# Patient Record
Sex: FEMALE | Race: WHITE | NOT HISPANIC OR LATINO | Employment: OTHER | URBAN - METROPOLITAN AREA
[De-identification: names, ages, dates, MRNs, and addresses within clinical notes are randomized per-mention and may not be internally consistent; named-entity substitution may affect disease eponyms.]

---

## 2017-03-10 ENCOUNTER — OFFICE VISIT (OUTPATIENT)
Dept: MEDICAL GROUP | Facility: CLINIC | Age: 62
End: 2017-03-10
Payer: COMMERCIAL

## 2017-03-10 VITALS
BODY MASS INDEX: 34.83 KG/M2 | TEMPERATURE: 97.9 F | OXYGEN SATURATION: 97 % | WEIGHT: 204 LBS | HEART RATE: 96 BPM | DIASTOLIC BLOOD PRESSURE: 80 MMHG | SYSTOLIC BLOOD PRESSURE: 124 MMHG | HEIGHT: 64 IN

## 2017-03-10 DIAGNOSIS — Z12.31 ENCOUNTER FOR SCREENING MAMMOGRAM FOR MALIGNANT NEOPLASM OF BREAST: ICD-10-CM

## 2017-03-10 DIAGNOSIS — E11.21 CONTROLLED TYPE 2 DIABETES MELLITUS WITH DIABETIC NEPHROPATHY, WITHOUT LONG-TERM CURRENT USE OF INSULIN (HCC): ICD-10-CM

## 2017-03-10 DIAGNOSIS — I10 ESSENTIAL HYPERTENSION: ICD-10-CM

## 2017-03-10 DIAGNOSIS — R07.9 CHEST PAIN, UNSPECIFIED TYPE: ICD-10-CM

## 2017-03-10 DIAGNOSIS — E78.5 DYSLIPIDEMIA: ICD-10-CM

## 2017-03-10 LAB
HBA1C MFR BLD: NORMAL % (ref ?–5.8)
INT CON NEG: NEGATIVE
INT CON POS: POSITIVE

## 2017-03-10 PROCEDURE — 83036 HEMOGLOBIN GLYCOSYLATED A1C: CPT | Performed by: PHYSICIAN ASSISTANT

## 2017-03-10 PROCEDURE — 99214 OFFICE O/P EST MOD 30 MIN: CPT | Performed by: PHYSICIAN ASSISTANT

## 2017-03-10 NOTE — ASSESSMENT & PLAN NOTE
Currently on metformin -glyburide. Last A1c 7.7. Last eye exam within last year when she was having cataract surgery. Last micral albumin and creatinine ratio within normal limits. States for the past 3 months she has not been complient with diet.

## 2017-03-10 NOTE — ASSESSMENT & PLAN NOTE
Pt has known HTN, controlled with current medicines, lisinopril-HCTZ. She denies HA, blurred vision, dizziness, shortness of breath, palpitations, or chest pain, lower extremity edema.

## 2017-03-10 NOTE — ASSESSMENT & PLAN NOTE
10 days ago had mid chest pain at rest while in [place] visiting family. Chest pain was in mid chest, pain was more like pressure, w nausea, lasted 2 mins. Resolved on its own, no episodes since then. Diabetic pt currently on metfomin and lipitor, doean not take ASP.   About 4 yrs ago had carotid US on neck w 40% stenosis on the R side.

## 2017-03-10 NOTE — MR AVS SNAPSHOT
"        Garry Coronel   3/10/2017 3:05 PM   Office Visit   MRN: [id number]    Department:  [facility]   Dept Phone:  [phone]    Description:  Female : 1955   Provider:  Damaris Soares PA-C           Reason for Visit     Chest Pain pressure in chest only felt it once x95zham ago      Allergies as of 3/10/2017     Allergen Noted Reactions    Maxzide [Hydrochlorothiazide W-Triamterene] 2013         You were diagnosed with     Essential hypertension   [3931224]       Dyslipidemia   [197314]       Chest pain, unspecified type   [9469236]       Controlled type 2 diabetes mellitus with diabetic nephropathy, without long-term current use of insulin (CMS-HCC)   [9802135]       BMI 35.0-35.9,adult   [170519]       Encounter for screening mammogram for malignant neoplasm of breast   [302462]         Vital Signs     Blood Pressure Pulse Temperature Height Weight Body Mass Index    124/80 mmHg 96 36.6 °C (97.9 °F) 1.626 m (5' 4\") 92.534 kg (204 lb) 35.00 kg/m2    Oxygen Saturation Smoking Status                97% Former Smoker          Basic Information     Date Of Birth Sex Race Ethnicity Preferred Language    1955 Female White Non- English      Problem List              ICD-10-CM Priority Class Noted - Resolved    HTN (hypertension) I10   2013 - Present    Sleep apnea G47.30   2013 - Present    B12 deficiency E53.8   2013 - Present    Dyslipidemia E78.5   2013 - Present    Vitamin D deficiency E55.9   2013 - Present    Obesity (BMI 30-39.9) E66.9   10/9/2014 - Present    CKD stage 3 due to type 2 diabetes mellitus (CMS-HCC) E11.22, N18.3   2015 - Present    DM (diabetes mellitus) type II controlled with renal manifestation (CMS-HCC) E11.29   2015 - Present    Chest pain R07.9   3/10/2017 - Present      Health Maintenance        Date Due Completion Dates    IMM ZOSTER VACCINE 2014 ---    MAMMOGRAM 2016    RETINAL SCREENING " 5/6/2016 5/6/2015, 5/6/2015 (N/S)    Override on 5/6/2015: (N/S)    IMM INFLUENZA (1) 9/1/2016 11/9/2015, 10/9/2014, 12/27/2013    A1C SCREENING 3/12/2017 9/12/2016, 10/6/2015, 7/2/2015, 9/16/2014, 3/14/2014, 10/23/2013    FASTING LIPID PROFILE 9/12/2017 9/12/2016, 10/6/2015, 7/2/2015, 9/16/2014, 3/14/2014, 10/23/2013, 1/24/2013    SERUM CREATININE 9/12/2017 9/12/2016, 10/6/2015, 7/2/2015, 9/16/2014, 3/14/2014, 10/23/2013, 1/24/2013    URINE ACR / MICROALBUMIN 9/19/2017 9/19/2016, 7/2/2015, 3/14/2014, 1/24/2013    DIABETES MONOFILAMENT / LE EXAM 9/19/2017 9/19/2016 (N/S), 11/9/2015 (N/S), 3/13/2014 (N/S)    Override on 9/19/2016: (N/S)    Override on 11/9/2015: (N/S)    Override on 3/13/2014: (N/S)    PAP SMEAR 7/9/2018 7/9/2015 (Done), 7/9/2015    Override on 7/9/2015: Done    COLONOSCOPY 10/9/2024 10/9/2014 (Declined)    Override on 10/9/2014: Patient Declined    IMM DTaP/Tdap/Td Vaccine (2 - Td) 7/9/2025 7/9/2015            Results     POCT  A1C      Component    Glycohemoglobin    9.6%    Internal Control Negative    Negative    Internal Control Positive    Positive                        Current Immunizations     13-VALENT PCV PREVNAR 11/9/2015  3:20 PM    Influenza TIV (IM) 12/27/2013 11:15 AM    Influenza Vaccine Quad Inj (Pf) 11/9/2015  3:20 PM, 10/9/2014 10:30 AM    Pneumococcal polysaccharide vaccine (PPSV-23) 10/9/2014 10:35 AM    Tdap Vaccine 7/9/2015  3:20 PM      Below and/or attached are the medications your provider expects you to take. Review all of your home medications and newly ordered medications with your provider and/or pharmacist. Follow medication instructions as directed by your provider and/or pharmacist. Please keep your medication list with you and share with your provider. Update the information when medications are discontinued, doses are changed, or new medications (including over-the-counter products) are added; and carry medication information at all times in the event of emergency  situations     Allergies:  MAXZIDE - (reactions not documented)               Medications  Valid as of: March 10, 2017 -  4:17 PM    Generic Name Brand Name Tablet Size Instructions for use    Atorvastatin Calcium (Tab) LIPITOR 10 MG TAKE ONE TABLET BY MOUTH ONCE DAILY        Blood Glucose Monitoring Suppl (Kit) ACCU-CHEK ADVANTAGE DIABETES  1 Each by Does not apply route every day.        Cholecalciferol (Tab) cholecalciferol 1000 UNIT Take 2,000 Units by mouth every day.        Cyanocobalamin (Tab) VITAMIN B-12 500 MCG Take 500 mcg by mouth every 48 hours.        GlyBURIDE-MetFORMIN (Tab) GLUCOVANCE 5-500 MG Take 1 Tab by mouth 2 times a day, with meals.        Guaifenesin-Codeine (Syrup) TUSSI-ORGANIDIN -10 MG/5ML Take 5 mL by mouth 3 times a day as needed for Cough (or use qhs).        Lisinopril-Hydrochlorothiazide (Tab) PRINZIDE, ZESTORETIC 20-12.5 MG TAKE ONE TABLET BY MOUTH ONCE DAILY        Multiple Vitamins-Minerals (Tab) WOMENS 50+ MULTI VITAMIN/MIN  Take  by mouth.        Promethazine-Codeine (Syrup) PHENERGAN-CODEINE 6.25-10 MG/5ML Take 5-10 mL by mouth 4 times a day as needed for Cough.        Promethazine-Codeine (Syrup) PHENERGAN-CODEINE 6.25-10 MG/5ML Take 5-10 mL by mouth 4 times a day as needed for Cough.        .                 Medicines prescribed today were sent to:     [address]), [facility] [id number] () [place] [address]    Phone: [phone] Fax: [phone]    Open 24 Hours?: No      Medication refill instructions:       If your prescription bottle indicates you have medication refills left, it is not necessary to call your provider’s office. Please contact your pharmacy and they will refill your medication.    If your prescription bottle indicates you do not have any refills left, you may request refills at any time through one of the following ways: The online [employer] system (except Urgent Care), by calling your provider’s  office, or by asking your pharmacy to contact your provider’s office with a refill request. Medication refills are processed only during regular business hours and may not be available until the next business day. Your provider may request additional information or to have a follow-up visit with you prior to refilling your medication.   *Please Note: Medication refills are assigned a new Rx number when refilled electronically. Your pharmacy may indicate that no refills were authorized even though a new prescription for the same medication is available at the pharmacy. Please request the medicine by name with the pharmacy before contacting your provider for a refill.           [employer] Access Code: Activation code not generated  Current [employer] Status: Active

## 2017-03-10 NOTE — PROGRESS NOTES
Subjective:   CC: Garry Coronel is a 61 y.o. female here today for establishing care. Patient was under care of Dr. Alvarado.    HTN (hypertension)  Pt has known HTN, controlled with current medicines, lisinopril-HCTZ. She denies HA, blurred vision, dizziness, shortness of breath, palpitations, or chest pain, lower extremity edema.    Dyslipidemia  Continues on atorvastatin 10 mg w/o any SE or mylagia.      Chest pain  This is a new problem. 10 days ago had mid chest pain at rest while in [place] visiting family. Chest pain was in mid chest radiating to the back, not radiating to left arm or Jaw. pain was more like pressure, w nausea, lasted 2 mins. Resolved on its own, no episodes since then. About 4 yrs ago had carotid US on neck w 40% stenosis on the R side.   Currently denies any chest pain, shortness of breath, chest tightness or lower leg edema      DM (diabetes mellitus) type II controlled with renal manifestation  Currently on metformin -glyburide. Last A1c 7.7. Last eye exam within last year when she was having cataract surgery. Last micral albumin and creatinine ratio within normal limits. States for the past 3 months she has not been complient with diet.          Current medicines (including changes today)  Current Outpatient Prescriptions   Medication Sig Dispense Refill   • lisinopril-hydrochlorothiazide (PRINZIDE, ZESTORETIC) 20-12.5 MG per tablet TAKE ONE TABLET BY MOUTH ONCE DAILY 90 Tab 1   • glyburide-metformin (GLUCOVANCE) 5-500 MG Tab Take 1 Tab by mouth 2 times a day, with meals. 180 Tab 1   • atorvastatin (LIPITOR) 10 MG Tab TAKE ONE TABLET BY MOUTH ONCE DAILY 90 Tab 1   • vitamin D (CHOLECALCIFEROL) 1000 UNIT TABS Take 2,000 Units by mouth every day.     • Multiple Vitamins-Minerals (WOMENS 50+ MULTI VITAMIN/MIN) TABS Take  by mouth.     • Blood Glucose Monitoring Suppl (ACCU-CHEK ADVANTAGE DIABETES) KIT 1 Each by Does not apply route every day. 1 Each 0   • promethazine-codeine  "(PHENERGAN-CODEINE) 6.25-10 MG/5ML SYRP Take 5-10 mL by mouth 4 times a day as needed for Cough. 150 mL 0   • guaifenesin-codeine (TUSSI-ORGANIDIN NR) 100-10 MG/5ML syrup Take 5 mL by mouth 3 times a day as needed for Cough (or use qhs). 150 mL 0   • promethazine-codeine (PHENERGAN-CODEINE) 6.25-10 MG/5ML SYRP Take 5-10 mL by mouth 4 times a day as needed for Cough. 150 mL 0   • cyanocobalamin (VITAMIN B-12) 500 MCG TABS Take 500 mcg by mouth every 48 hours.       No current facility-administered medications for this visit.         Past medical, surgical, family, and social history are reviewed in Epic chart by me today.   Medications and allergies reviewed in Epic chart by me today.         ROS   As documented in history of present illness above     Objective:     Blood pressure 124/80, pulse 96, temperature 36.6 °C (97.9 °F), height 1.626 m (5' 4\"), weight 92.534 kg (204 lb), SpO2 97 %. Body mass index is 35 kg/(m^2).   Physical Exam:  Constitutional: Alert, oriented in no acute distress.  Psych: Eye contact is good, speech goal directed, affect calm  Eyes: Conjunctiva non-injected, sclera non-icteric.  Lungs: Unlabored respiratory effort, clear to auscultation bilaterally with good excursion, no wheez or rhonci  CV: regular rate and rhythm. No lower extremity edema          Assessment and Plan:   The following treatment plan was discussed    1. Essential hypertension  Controlled continue meds    2. Dyslipidemia  Controlled continue meds    3. Chest pain, unspecified type  EKG was not done today because patient is currently asymptomatic  - CARDIAC STRESS TEST TREADMILL ONLY    4. Controlled type 2 diabetes mellitus with diabetic nephropathy, without long-term current use of insulin (CMS-[facility])  Discussed adding a new medicine. Patient admits to not being compliant  Going to be evaluated in 3 months. If A1c is not improving adding another medicine  - POCT  A1C --> 9.6    5. BMI 35.0-35.9,adult    - Patient identified " as having weight management issue.  Appropriate orders and counseling given.    6. Encounter for screening mammogram for malignant neoplasm of breast    - MA-SCREEN MAMMO W/CAD-BILAT    We discussed red flags incuding worsening pain, pressure, shortness of breath or overall decline in health. Patient verbalize understanding and will either call our office or present to the emergency room.     Followup: Return if symptoms worsen or fail to improve.         Please note that this dictation was created using voice recognition software. I have made every reasonable attempt to correct obvious errors, but I expect that there are errors of grammar and possibly content that I did not discover before finalizing the note.

## 2017-03-27 DIAGNOSIS — E78.5 DYSLIPIDEMIA: ICD-10-CM

## 2017-03-28 NOTE — TELEPHONE ENCOUNTER
Was the patient seen in the last year in this department? Yes     Does patient have an active prescription for medications requested? No     Received Request Via: Pharmacy   RX : Atorvastatin

## 2017-03-29 ENCOUNTER — NON-PROVIDER VISIT (OUTPATIENT)
Dept: CARDIOLOGY | Facility: MEDICAL CENTER | Age: 62
End: 2017-03-29
Attending: PHYSICIAN ASSISTANT
Payer: COMMERCIAL

## 2017-03-29 DIAGNOSIS — R07.9 CHEST PAIN, UNSPECIFIED TYPE: ICD-10-CM

## 2017-03-29 LAB — TREADMILL STRESS: NORMAL

## 2017-03-29 PROCEDURE — 93015 CV STRESS TEST SUPVJ I&R: CPT | Performed by: INTERNAL MEDICINE

## 2017-04-19 DIAGNOSIS — I10 ESSENTIAL HYPERTENSION: ICD-10-CM

## 2017-04-20 RX ORDER — LISINOPRIL AND HYDROCHLOROTHIAZIDE 20; 12.5 MG/1; MG/1
TABLET ORAL
Qty: 90 TAB | Refills: 0 | Status: SHIPPED | OUTPATIENT
Start: 2017-04-20 | End: 2017-08-16 | Stop reason: SDUPTHER

## 2017-04-20 NOTE — TELEPHONE ENCOUNTER
I don't see any new lab results for this patient. Please f/u on any possible lab results from media.  Refilled BP med for 3 mons. If no new lab results --> no more refills until new labs are done.    Damaris Soares PA-C

## 2017-05-31 RX ORDER — GLYBURIDE-METFORMIN HYDROCHLORIDE 5; 500 MG/1; MG/1
1 TABLET ORAL 2 TIMES DAILY WITH MEALS
Qty: 180 TAB | Refills: 0 | Status: SHIPPED | OUTPATIENT
Start: 2017-05-31 | End: 2017-08-17 | Stop reason: SDUPTHER

## 2017-08-16 RX ORDER — LISINOPRIL AND HYDROCHLOROTHIAZIDE 20; 12.5 MG/1; MG/1
TABLET ORAL
Qty: 90 TAB | Refills: 0 | Status: SHIPPED | OUTPATIENT
Start: 2017-08-16 | End: 2017-11-04 | Stop reason: SDUPTHER

## 2017-08-17 RX ORDER — ATORVASTATIN CALCIUM 10 MG/1
TABLET, FILM COATED ORAL
Qty: 90 TAB | Refills: 2 | Status: SHIPPED | OUTPATIENT
Start: 2017-08-17 | End: 2018-04-27 | Stop reason: SDUPTHER

## 2017-08-17 RX ORDER — GLYBURIDE-METFORMIN HYDROCHLORIDE 5; 500 MG/1; MG/1
1 TABLET ORAL 2 TIMES DAILY WITH MEALS
Qty: 180 TAB | Refills: 0 | Status: SHIPPED | OUTPATIENT
Start: 2017-08-17 | End: 2018-04-27 | Stop reason: SDUPTHER

## 2017-08-17 NOTE — TELEPHONE ENCOUNTER
Was the patient seen in the last year in this department? Yes    Does patient have an active prescription for medications requested? No   Received Request Via: Patient

## 2017-08-30 ENCOUNTER — TELEPHONE (OUTPATIENT)
Dept: MEDICAL GROUP | Facility: MEDICAL CENTER | Age: 62
End: 2017-08-30

## 2017-08-30 NOTE — TELEPHONE ENCOUNTER
Pt is due for follow up o uncontrolled diabetes. Needs appointment for further med refill.,  Also letter from  [facility] states she has been noncompliant w medicine.   Please inform patient.      Damaris Soares P.A.-C.

## 2017-11-04 DIAGNOSIS — I10 ESSENTIAL HYPERTENSION: ICD-10-CM

## 2017-11-08 RX ORDER — LISINOPRIL AND HYDROCHLOROTHIAZIDE 20; 12.5 MG/1; MG/1
TABLET ORAL
Qty: 90 TAB | Refills: 3 | Status: SHIPPED | OUTPATIENT
Start: 2017-11-08 | End: 2018-04-27 | Stop reason: SDUPTHER

## 2017-11-15 ENCOUNTER — OFFICE VISIT (OUTPATIENT)
Dept: MEDICAL GROUP | Facility: MEDICAL CENTER | Age: 62
End: 2017-11-15
Payer: COMMERCIAL

## 2017-11-15 ENCOUNTER — HOSPITAL ENCOUNTER (OUTPATIENT)
Dept: RADIOLOGY | Facility: MEDICAL CENTER | Age: 62
End: 2017-11-15
Attending: PHYSICIAN ASSISTANT
Payer: COMMERCIAL

## 2017-11-15 VITALS
BODY MASS INDEX: 34.52 KG/M2 | HEART RATE: 79 BPM | OXYGEN SATURATION: 97 % | DIASTOLIC BLOOD PRESSURE: 90 MMHG | SYSTOLIC BLOOD PRESSURE: 144 MMHG | TEMPERATURE: 98.6 F | WEIGHT: 202.2 LBS | HEIGHT: 64 IN | RESPIRATION RATE: 16 BRPM

## 2017-11-15 DIAGNOSIS — S49.92XA INJURY OF LEFT SHOULDER, INITIAL ENCOUNTER: ICD-10-CM

## 2017-11-15 PROCEDURE — 99213 OFFICE O/P EST LOW 20 MIN: CPT | Performed by: PHYSICIAN ASSISTANT

## 2017-11-15 PROCEDURE — 73030 X-RAY EXAM OF SHOULDER: CPT | Mod: LT

## 2017-11-15 NOTE — PROGRESS NOTES
Subjective:      Garry Coronel is a 61 y.o. female who presents with Shoulder Pain (yesterday fell while pushing a shopping cart, heard pops in shoulder, took OTC meds and iced it) and Other (some associated back and elbow pain)          Chief Complaint   Patient presents with   • Shoulder Pain     yesterday fell while pushing a shopping cart, heard pops in shoulder, took OTC meds and iced it   • Other     some associated back and elbow pain       HPI  Patient presents with c/o left shoulder pain. Yesterday tripped while shopping, fell forward holding onto the shopping cart stretched out arm and felt a pop then fell on arm. No deformity or swelling. No bruising. No previous injuries. Tried ice and ibuprofen. Still hurting. Hurts to lift. Going out of town on the weekend. No other injury. No rash or skin lesion.     ROSno fever/chills. No headache/dizziness. No neck or back pain. No chest pain, SOB or difficulty breathing. No n/v/d/c or abdominal pain. No urinary symptoms.    Active Ambulatory Problems     Diagnosis Date Noted   • HTN (hypertension) 01/24/2013   • Sleep apnea 01/24/2013   • B12 deficiency 02/07/2013   • Dyslipidemia 02/07/2013   • Vitamin D deficiency 12/27/2013   • Obesity (BMI 30-39.9) 10/09/2014   • CKD stage 3 due to type 2 diabetes mellitus (CMS-HCC) 11/09/2015   • DM (diabetes mellitus) type II controlled with renal manifestation (CMS-HCC) 11/09/2015   • Chest pain 03/10/2017     Resolved Ambulatory Problems     Diagnosis Date Noted   • Obesity, morbid (more than 100 lbs over ideal weight or BMI > 40) (CMS-HCC) 01/24/2013   • Morbid obesity (CMS-HCC) 02/07/2013   • DM II (diabetes mellitus, type II), controlled (CMS-HCC) 02/07/2013     Past Medical History:   Diagnosis Date   • Hypertension    • Sleep apnea      Current Outpatient Prescriptions   Medication Sig Dispense Refill   • lisinopril-hydrochlorothiazide (PRINZIDE, ZESTORETIC) 20-12.5 MG per tablet TAKE ONE TABLET BY MOUTH ONCE DAILY 90  "Tab 3   • atorvastatin (LIPITOR) 10 MG Tab TAKE ONE TABLET BY MOUTH ONCE DAILY 90 Tab 2   • glyburide-metformin (GLUCOVANCE) 5-500 MG Tab Take 1 Tab by mouth 2 times a day, with meals. 180 Tab 0   • vitamin D (CHOLECALCIFEROL) 1000 UNIT TABS Take 2,000 Units by mouth every day.     • cyanocobalamin (VITAMIN B-12) 500 MCG TABS Take 500 mcg by mouth every 48 hours.     • Multiple Vitamins-Minerals (WOMENS 50+ MULTI VITAMIN/MIN) TABS Take  by mouth.     • Blood Glucose Monitoring Suppl (ACCU-CHEK ADVANTAGE DIABETES) KIT 1 Each by Does not apply route every day. 1 Each 0     No current facility-administered medications for this visit.    allergy to maxide  Non-smoker   Objective:     /90   Pulse 79   Temp 37 °C (98.6 °F)   Resp 16   Ht 1.626 m (5' 4\")   Wt 91.7 kg (202 lb 3.2 oz)   SpO2 97%   BMI 34.71 kg/m²      Physical Exam   Constitutional: She is oriented to person, place, and time. She appears well-developed and well-nourished. No distress.   Musculoskeletal:        Left shoulder: She exhibits decreased range of motion, tenderness, bony tenderness, pain and decreased strength. She exhibits no swelling, no effusion and no spasm.   Neurological: She is alert and oriented to person, place, and time.   Skin: Skin is warm and dry. No rash noted. She is not diaphoretic. No pallor.   Psychiatric: She has a normal mood and affect. Her behavior is normal. Judgment and thought content normal.   Vitals reviewed.              Assessment/Plan:     1. Injury of left shoulder, initial encounter    - DX-SHOULDER 2+ LEFT; Future    Sling, ibuprofen, ice. Will call with xray results. When patient returns will consider MRI if pain and weakness continue  "

## 2017-11-16 ENCOUNTER — PATIENT MESSAGE (OUTPATIENT)
Dept: MEDICAL GROUP | Facility: MEDICAL CENTER | Age: 62
End: 2017-11-16

## 2017-11-16 ENCOUNTER — TELEPHONE (OUTPATIENT)
Dept: MEDICAL GROUP | Facility: MEDICAL CENTER | Age: 62
End: 2017-11-16

## 2017-11-16 NOTE — TELEPHONE ENCOUNTER
Written by Modesta Michael P.A.-C. on 11/16/2017  9:26 AM   Good Morning,   Shoulder xray does not show any fracture. There is some arthritis that is likely inflammed from the fall. Rest, ibuprofen, gentle stretching. Let me know when you get back in town if pain and/or weakness continues and we should consider MRI.   Thanks!   Modesta Michael PA-C   ----------------------------------------------------------------------------------------------------------------------------------------------------------------------------  Left pt a mess to call back.

## 2017-11-16 NOTE — TELEPHONE ENCOUNTER
----- Message from Modesta Michael P.A.-C. sent at 11/16/2017  9:26 AM PST -----  Please call patient and verify she receives results. Thanks! Modesta Michael PA-C

## 2018-04-27 DIAGNOSIS — E11.21 CONTROLLED TYPE 2 DIABETES MELLITUS WITH DIABETIC NEPHROPATHY, WITHOUT LONG-TERM CURRENT USE OF INSULIN (HCC): ICD-10-CM

## 2018-04-27 DIAGNOSIS — N18.30 CKD STAGE 3 DUE TO TYPE 2 DIABETES MELLITUS (HCC): ICD-10-CM

## 2018-04-27 DIAGNOSIS — I10 ESSENTIAL HYPERTENSION: ICD-10-CM

## 2018-04-27 DIAGNOSIS — E11.22 CKD STAGE 3 DUE TO TYPE 2 DIABETES MELLITUS (HCC): ICD-10-CM

## 2018-04-27 DIAGNOSIS — E78.5 DYSLIPIDEMIA: ICD-10-CM

## 2018-04-27 DIAGNOSIS — Z00.00 PREVENTATIVE HEALTH CARE: ICD-10-CM

## 2018-04-27 RX ORDER — GLYBURIDE-METFORMIN HYDROCHLORIDE 5; 500 MG/1; MG/1
1 TABLET ORAL 2 TIMES DAILY WITH MEALS
Qty: 180 TAB | Refills: 0 | Status: SHIPPED | OUTPATIENT
Start: 2018-04-27 | End: 2018-07-24

## 2018-04-27 RX ORDER — LISINOPRIL AND HYDROCHLOROTHIAZIDE 20; 12.5 MG/1; MG/1
TABLET ORAL
Qty: 90 TAB | Refills: 0 | Status: SHIPPED | OUTPATIENT
Start: 2018-04-27 | End: 2018-07-24

## 2018-04-27 RX ORDER — ATORVASTATIN CALCIUM 10 MG/1
TABLET, FILM COATED ORAL
Qty: 90 TAB | Refills: 0 | Status: SHIPPED | OUTPATIENT
Start: 2018-04-27 | End: 2018-09-10 | Stop reason: SDUPTHER

## 2018-04-27 NOTE — TELEPHONE ENCOUNTER
Pt is way over due for f/u appointment and blood work. I have it documented on 8/30/17 tel encounter that no more refills until blood work is done.   She has an appointment on 5/8/18.    Please inform patient that I have ordered blood work for her. Advise getting it done before coming in.    Damaris Soares P.A.-C.

## 2018-05-03 ENCOUNTER — HOSPITAL ENCOUNTER (OUTPATIENT)
Dept: LAB | Facility: MEDICAL CENTER | Age: 63
End: 2018-05-03
Attending: PHYSICIAN ASSISTANT
Payer: COMMERCIAL

## 2018-05-03 DIAGNOSIS — E11.22 CKD STAGE 3 DUE TO TYPE 2 DIABETES MELLITUS (HCC): ICD-10-CM

## 2018-05-03 DIAGNOSIS — I10 ESSENTIAL HYPERTENSION: ICD-10-CM

## 2018-05-03 DIAGNOSIS — Z00.00 PREVENTATIVE HEALTH CARE: ICD-10-CM

## 2018-05-03 DIAGNOSIS — N18.30 CKD STAGE 3 DUE TO TYPE 2 DIABETES MELLITUS (HCC): ICD-10-CM

## 2018-05-03 DIAGNOSIS — E78.5 DYSLIPIDEMIA: ICD-10-CM

## 2018-05-03 DIAGNOSIS — E11.21 CONTROLLED TYPE 2 DIABETES MELLITUS WITH DIABETIC NEPHROPATHY, WITHOUT LONG-TERM CURRENT USE OF INSULIN (HCC): ICD-10-CM

## 2018-05-03 LAB
25(OH)D3 SERPL-MCNC: 44 NG/ML (ref 30–100)
ALBUMIN SERPL BCP-MCNC: 3.8 G/DL (ref 3.2–4.9)
ALBUMIN/GLOB SERPL: 1.2 G/DL
ALP SERPL-CCNC: 77 U/L (ref 30–99)
ALT SERPL-CCNC: 15 U/L (ref 2–50)
ANION GAP SERPL CALC-SCNC: 8 MMOL/L (ref 0–11.9)
AST SERPL-CCNC: 16 U/L (ref 12–45)
BASOPHILS # BLD AUTO: 0.8 % (ref 0–1.8)
BASOPHILS # BLD: 0.08 K/UL (ref 0–0.12)
BILIRUB SERPL-MCNC: 0.7 MG/DL (ref 0.1–1.5)
BUN SERPL-MCNC: 36 MG/DL (ref 8–22)
CALCIUM SERPL-MCNC: 9.4 MG/DL (ref 8.5–10.5)
CHLORIDE SERPL-SCNC: 100 MMOL/L (ref 96–112)
CHOLEST SERPL-MCNC: 144 MG/DL (ref 100–199)
CO2 SERPL-SCNC: 27 MMOL/L (ref 20–33)
CREAT SERPL-MCNC: 1.35 MG/DL (ref 0.5–1.4)
CREAT UR-MCNC: 215 MG/DL
EOSINOPHIL # BLD AUTO: 0.32 K/UL (ref 0–0.51)
EOSINOPHIL NFR BLD: 3.3 % (ref 0–6.9)
ERYTHROCYTE [DISTWIDTH] IN BLOOD BY AUTOMATED COUNT: 44 FL (ref 35.9–50)
GLOBULIN SER CALC-MCNC: 3.3 G/DL (ref 1.9–3.5)
GLUCOSE SERPL-MCNC: 307 MG/DL (ref 65–99)
HCT VFR BLD AUTO: 42.9 % (ref 37–47)
HDLC SERPL-MCNC: 37 MG/DL
HGB BLD-MCNC: 13.7 G/DL (ref 12–16)
IMM GRANULOCYTES # BLD AUTO: 0.04 K/UL (ref 0–0.11)
IMM GRANULOCYTES NFR BLD AUTO: 0.4 % (ref 0–0.9)
LDLC SERPL CALC-MCNC: 70 MG/DL
LYMPHOCYTES # BLD AUTO: 2.3 K/UL (ref 1–4.8)
LYMPHOCYTES NFR BLD: 24 % (ref 22–41)
MCH RBC QN AUTO: 28.5 PG (ref 27–33)
MCHC RBC AUTO-ENTMCNC: 31.9 G/DL (ref 33.6–35)
MCV RBC AUTO: 89.2 FL (ref 81.4–97.8)
MICROALBUMIN UR-MCNC: 1 MG/DL
MICROALBUMIN/CREAT UR: 5 MG/G (ref 0–30)
MONOCYTES # BLD AUTO: 0.49 K/UL (ref 0–0.85)
MONOCYTES NFR BLD AUTO: 5.1 % (ref 0–13.4)
NEUTROPHILS # BLD AUTO: 6.37 K/UL (ref 2–7.15)
NEUTROPHILS NFR BLD: 66.4 % (ref 44–72)
NRBC # BLD AUTO: 0 K/UL
NRBC BLD-RTO: 0 /100 WBC
PLATELET # BLD AUTO: 223 K/UL (ref 164–446)
PMV BLD AUTO: 11.4 FL (ref 9–12.9)
POTASSIUM SERPL-SCNC: 4.9 MMOL/L (ref 3.6–5.5)
PROT SERPL-MCNC: 7.1 G/DL (ref 6–8.2)
RBC # BLD AUTO: 4.81 M/UL (ref 4.2–5.4)
SODIUM SERPL-SCNC: 135 MMOL/L (ref 135–145)
TRIGL SERPL-MCNC: 185 MG/DL (ref 0–149)
TSH SERPL DL<=0.005 MIU/L-ACNC: 2.21 UIU/ML (ref 0.38–5.33)
WBC # BLD AUTO: 9.6 K/UL (ref 4.8–10.8)

## 2018-05-03 PROCEDURE — 80053 COMPREHEN METABOLIC PANEL: CPT

## 2018-05-03 PROCEDURE — 82306 VITAMIN D 25 HYDROXY: CPT

## 2018-05-03 PROCEDURE — 80061 LIPID PANEL: CPT

## 2018-05-03 PROCEDURE — 85025 COMPLETE CBC W/AUTO DIFF WBC: CPT

## 2018-05-03 PROCEDURE — 36415 COLL VENOUS BLD VENIPUNCTURE: CPT

## 2018-05-03 PROCEDURE — 84443 ASSAY THYROID STIM HORMONE: CPT

## 2018-05-03 PROCEDURE — 82043 UR ALBUMIN QUANTITATIVE: CPT

## 2018-05-03 PROCEDURE — 82570 ASSAY OF URINE CREATININE: CPT

## 2018-05-08 ENCOUNTER — OFFICE VISIT (OUTPATIENT)
Dept: MEDICAL GROUP | Facility: MEDICAL CENTER | Age: 63
End: 2018-05-08
Payer: COMMERCIAL

## 2018-05-08 VITALS
BODY MASS INDEX: 32.93 KG/M2 | HEART RATE: 72 BPM | WEIGHT: 192.9 LBS | SYSTOLIC BLOOD PRESSURE: 118 MMHG | OXYGEN SATURATION: 95 % | HEIGHT: 64 IN | DIASTOLIC BLOOD PRESSURE: 68 MMHG | TEMPERATURE: 97.1 F

## 2018-05-08 DIAGNOSIS — E11.00 UNCONTROLLED TYPE 2 DM WITH HYPEROSMOLAR NONKETOTIC HYPERGLYCEMIA (HCC): ICD-10-CM

## 2018-05-08 DIAGNOSIS — E78.5 DYSLIPIDEMIA: ICD-10-CM

## 2018-05-08 DIAGNOSIS — N18.30 CKD STAGE 3 DUE TO TYPE 2 DIABETES MELLITUS (HCC): ICD-10-CM

## 2018-05-08 DIAGNOSIS — B35.1 ONYCHOMYCOSIS: ICD-10-CM

## 2018-05-08 DIAGNOSIS — G47.30 SLEEP APNEA, UNSPECIFIED TYPE: ICD-10-CM

## 2018-05-08 DIAGNOSIS — Z12.31 ENCOUNTER FOR SCREENING MAMMOGRAM FOR MALIGNANT NEOPLASM OF BREAST: ICD-10-CM

## 2018-05-08 DIAGNOSIS — E11.22 CKD STAGE 3 DUE TO TYPE 2 DIABETES MELLITUS (HCC): ICD-10-CM

## 2018-05-08 DIAGNOSIS — I10 ESSENTIAL HYPERTENSION: ICD-10-CM

## 2018-05-08 LAB
HBA1C MFR BLD: 11.6 % (ref ?–5.8)
INT CON NEG: NORMAL
INT CON POS: NORMAL

## 2018-05-08 PROCEDURE — 99214 OFFICE O/P EST MOD 30 MIN: CPT | Performed by: PHYSICIAN ASSISTANT

## 2018-05-08 PROCEDURE — 83036 HEMOGLOBIN GLYCOSYLATED A1C: CPT | Performed by: PHYSICIAN ASSISTANT

## 2018-05-08 RX ORDER — PYRIDOXINE HCL (VITAMIN B6) 100 MG
TABLET ORAL
COMMUNITY
End: 2018-07-24

## 2018-05-08 RX ORDER — PERPHENAZINE/AMITRIPTYLINE HCL 4MG-10MG
TABLET ORAL
COMMUNITY
End: 2018-07-24

## 2018-05-08 RX ORDER — AMPICILLIN TRIHYDRATE 250 MG
CAPSULE ORAL
COMMUNITY
End: 2018-07-24

## 2018-05-08 ASSESSMENT — PATIENT HEALTH QUESTIONNAIRE - PHQ9: CLINICAL INTERPRETATION OF PHQ2 SCORE: 0

## 2018-05-08 NOTE — PROGRESS NOTES
Subjective:   CC:   Chief Complaint   Patient presents with   • Results     Review Labs   • Diabetes     Follow up                Garry Coronel is a 62 y.o. female here today for f/u:    Uncontrolled type 2 diabetes:    Unfortunately patient has been noncompliant and has not been having any follow-up visits for diabetes was X 15 months. Last A1c was from March 2017 and back and she had elevated A1c of 9.6. States since then she has cut back on her medicines is only taking one metformin- glyburide 5-5000 mg a day. She has not been taking her blood sugar at home. When asked why she decided to cut back on her medicines why she was not following her diabetes patient states that she has a lot going on and has been taking care of her sick mom and also her dad has dementia. She cut back on medicine because she thought she can do well with diet.  Patient has stage III CKD with last GFR 40. Continues on lisinopril hydrochlorothiazide 20-12.5 daily for HTN and atorvastatin 10 MG daily for dyslipidemia.          Current medicines (including changes today)  Current Outpatient Prescriptions   Medication Sig Dispense Refill   • Calcium 600-200 MG-UNIT Tab Take  by mouth.     • Cranberry 500 MG Cap Take  by mouth.     • Calcium Carb-Cholecalciferol (CALTRATE 600+D3 SOFT PO) Take  by mouth.     • Coconut Oil 1000 MG Cap Take  by mouth.     • Docosahexaenoic Acid (PRENATAL DHA) 200 MG Cap Take  by mouth.     • Garlic 1000 MG Cap Take  by mouth.     • Cinnamon 500 MG Cap Take  by mouth.     • atorvastatin (LIPITOR) 10 MG Tab TAKE ONE TABLET BY MOUTH ONCE DAILY 90 Tab 0   • lisinopril-hydrochlorothiazide (PRINZIDE, ZESTORETIC) 20-12.5 MG per tablet TAKE ONE TABLET BY MOUTH ONCE DAILY 90 Tab 0   • glyBURIDE-metFORMIN (GLUCOVANCE) 5-500 MG Tab Take 1 Tab by mouth 2 times a day, with meals. 180 Tab 0   • vitamin D (CHOLECALCIFEROL) 1000 UNIT TABS Take 2,000 Units by mouth every day.     • cyanocobalamin (VITAMIN B-12) 500 MCG TABS  "Take 500 mcg by mouth every 48 hours.     • Multiple Vitamins-Minerals (WOMENS 50+ MULTI VITAMIN/MIN) TABS Take  by mouth.     • Blood Glucose Monitoring Suppl (ACCU-CHEK ADVANTAGE DIABETES) KIT 1 Each by Does not apply route every day. 1 Each 0     No current facility-administered medications for this visit.          Past medical, surgical, family, and social history are reviewed in Epic chart by me today.   Medications and allergies reviewed in Epic chart by me today.         ROS   No chest pain, no shortness of breath, no abdominal pain  As documented in history of present illness above     Objective:     Blood pressure 118/68, pulse 72, temperature 36.2 °C (97.1 °F), height 1.626 m (5' 4\"), weight 87.5 kg (192 lb 14.4 oz), SpO2 95 %, not currently breastfeeding. Body mass index is 33.11 kg/m².   Physical Exam:  Constitutional: Alert, oriented in no acute distress.  Psych: Eye contact is good, speech goal directed, affect calm  Eyes: Conjunctiva non-injected, sclera non-icteric.  Lungs: Unlabored respiratory effort, clear to auscultation bilaterally with good excursion, no wheez or rhonci  CV: regular rate and rhythm. No lower extremity edema  Ext: no edema, color normal, vascularity normal, temperature normal    Monofilament testing with a 10 gram force: sensation intact: intact bilaterally  Visual Inspection: Feet with dry scaling skin and hypertrophic toe nails. No maceration, ulcers, fissures.  Pedal pulses: decreased bilaterally    Last lab results were reviewed by me today and discussed w patient.      Assessment and Plan:   The following treatment plan was discussed    1. Uncontrolled type 2 DM with hyperosmolar nonketotic hyperglycemia (HCC)  Patient's A1c has worsened from 9.6-11.6. Long discussion with patient that she needs to be compliant in follow-up on her diabetes regularly at least every 3 months until we have her A1c under control.  At this point I'm sending patient to diabetes specialist Beto " [place]  Advised patient to increase glyburide/metformin to twice daily until seen by [address]  Check Blood sugar 3 times daily: Fasting, before dinner, before bed time. Return w number in 7-10 days.      - REFERRAL TO ENDOCRINOLOGY  - REFERRAL TO OPHTHALMOLOGY  - POCT A1C --> 11.6   - REFERRAL TO PODIATRY    2. Encounter for screening mammogram for malignant neoplasm of breast    - MA-SCREEN MAMMO W/CAD-BILAT; Future    3. BMI 33.0-33.9,adult    - Patient identified as having weight management issue.  Appropriate orders and counseling given.    4. Onychomycosis    - REFERRAL TO PODIATRY    5. Sleep apnea, unspecified type    - REFERRAL TO SLEEP STUDIES    6. Dyslipidemia  Continue current meds    7. Essential hypertension  Continue current meds    8. CKD stage 3 due to type 2 diabetes mellitus (HCC)    Followup: Return in about 3 months (around 8/8/2018).         Please note that this dictation was created using voice recognition software. I have made every reasonable attempt to correct obvious errors, but I expect that there are errors of grammar and possibly content that I did not discover before finalizing the note.

## 2018-05-08 NOTE — PATIENT INSTRUCTIONS
Check Blood sugar 3 times daily: Fasting, before dinner, before bed time. Return w number in 7-10 days.

## 2018-06-12 ENCOUNTER — HOSPITAL ENCOUNTER (OUTPATIENT)
Dept: RADIOLOGY | Facility: MEDICAL CENTER | Age: 63
End: 2018-06-12
Attending: PHYSICIAN ASSISTANT
Payer: COMMERCIAL

## 2018-06-12 DIAGNOSIS — Z12.31 VISIT FOR SCREENING MAMMOGRAM: ICD-10-CM

## 2018-06-12 PROCEDURE — 77067 SCR MAMMO BI INCL CAD: CPT

## 2018-07-03 ENCOUNTER — OFFICE VISIT (OUTPATIENT)
Dept: ENDOCRINOLOGY | Facility: MEDICAL CENTER | Age: 63
End: 2018-07-03
Payer: COMMERCIAL

## 2018-07-03 VITALS
WEIGHT: 178 LBS | HEIGHT: 64 IN | SYSTOLIC BLOOD PRESSURE: 106 MMHG | DIASTOLIC BLOOD PRESSURE: 72 MMHG | HEART RATE: 88 BPM | BODY MASS INDEX: 30.39 KG/M2 | OXYGEN SATURATION: 99 %

## 2018-07-03 DIAGNOSIS — E11.21 CONTROLLED TYPE 2 DIABETES MELLITUS WITH DIABETIC NEPHROPATHY, UNSPECIFIED WHETHER LONG TERM INSULIN USE (HCC): ICD-10-CM

## 2018-07-03 DIAGNOSIS — E66.9 OBESITY (BMI 30-39.9): ICD-10-CM

## 2018-07-03 DIAGNOSIS — I10 ESSENTIAL HYPERTENSION: ICD-10-CM

## 2018-07-03 PROCEDURE — 99204 OFFICE O/P NEW MOD 45 MIN: CPT | Performed by: PHYSICIAN ASSISTANT

## 2018-07-03 NOTE — PROGRESS NOTES
Return to office Patient Consult Note  Referred by: Damaris Soares P.A.-C.    Reason for consult: Diabetes Management Type 2    HPI:  Garry Coronel is a 62 y.o. old patient who is seeing us today for diabetes care.  This is a pleasant patient with diabetes and I appreciate the opportunity to participate in the care of this patient.    Labs of 5/8/18 HbA1c is 11.6, GFR 40, negative microalbumin    BG Diary:7/3/2018  In the AM:94, 146, 68, 88, 79, 67, 77, 49, 76    T2 since 1996    HX of gastric bipass      1. Controlled type 2 diabetes mellitus with diabetic nephropathy, unspecified whether long term insulin use (HCC)  This is a new patient with me on 7/3/18  She is on:  1.  Glyburide/Metformin    STOP  1.  Glyburide/Metformin    START:  1.  Ozempic 0.25  2.  Xigduo 10/1000 one in the AM      2. Essential hypertension  Having severe Hypoglycemia her BP seems to be under good control    3. Obesity (BMI 30-39.9)  STOP Glyburide because of hypoglycemia and weight gain    ROS:   Constitutional: No night sweats.  Eyes:  No visual changes.  Cardiac: No chest pain, No palpitations or racing heart rate.  Resp: No shortness of breath, No cough,   Gi: No Diarrhea    All other systems were reviewed and were/are negative.      Past Medical History:  Patient Active Problem List    Diagnosis Date Noted   • Chest pain 03/10/2017   • CKD stage 3 due to type 2 diabetes mellitus (HCC) 11/09/2015   • DM (diabetes mellitus) type II controlled with renal manifestation ([facility]) 11/09/2015   • Obesity (BMI 30-39.9) 10/09/2014   • Vitamin D deficiency 12/27/2013   • B12 deficiency 02/07/2013   • Dyslipidemia 02/07/2013   • HTN (hypertension) 01/24/2013   • Sleep apnea 01/24/2013       Past Surgical History:  Past Surgical History:   Procedure Laterality Date   • GASTRIC BYPASS LAPAROSCOPIC  12/13/2006       Allergies:  Maxzide [hydrochlorothiazide w-triamterene]    Social History:  Social History     Social History   • Marital status:       Spouse name: N/A   • Number of children: N/A   • Years of education: N/A     Occupational History   • Not on file.     Social History Main Topics   • Smoking status: Former Smoker     Types: Cigarettes     Quit date: 1/24/1978   • Smokeless tobacco: Never Used   • Alcohol use Yes   • Drug use: No   • Sexual activity: No     Other Topics Concern   • Not on file     Social History Narrative   • No narrative on file       Family History:  Family History   Problem Relation Age of Onset   • Diabetes Mother    • Hyperlipidemia Mother    • Diabetes Brother    • Diabetes Maternal Aunt    • Heart Disease Maternal Grandfather    • Stroke Maternal Grandfather    • Cancer Paternal Grandfather        Medications:    Current Outpatient Prescriptions:   •  Calcium 600-200 MG-UNIT Tab, Take  by mouth., Disp: , Rfl:   •  Cranberry 500 MG Cap, Take  by mouth., Disp: , Rfl:   •  Calcium Carb-Cholecalciferol (CALTRATE 600+D3 SOFT PO), Take  by mouth., Disp: , Rfl:   •  Coconut Oil 1000 MG Cap, Take  by mouth., Disp: , Rfl:   •  Docosahexaenoic Acid (PRENATAL DHA) 200 MG Cap, Take  by mouth., Disp: , Rfl:   •  Garlic 1000 MG Cap, Take  by mouth., Disp: , Rfl:   •  Cinnamon 500 MG Cap, Take  by mouth., Disp: , Rfl:   •  atorvastatin (LIPITOR) 10 MG Tab, TAKE ONE TABLET BY MOUTH ONCE DAILY, Disp: 90 Tab, Rfl: 0  •  lisinopril-hydrochlorothiazide (PRINZIDE, ZESTORETIC) 20-12.5 MG per tablet, TAKE ONE TABLET BY MOUTH ONCE DAILY, Disp: 90 Tab, Rfl: 0  •  glyBURIDE-metFORMIN (GLUCOVANCE) 5-500 MG Tab, Take 1 Tab by mouth 2 times a day, with meals., Disp: 180 Tab, Rfl: 0  •  vitamin D (CHOLECALCIFEROL) 1000 UNIT TABS, Take 2,000 Units by mouth every day., Disp: , Rfl:   •  cyanocobalamin (VITAMIN B-12) 500 MCG TABS, Take 500 mcg by mouth every 48 hours., Disp: , Rfl:   •  Multiple Vitamins-Minerals (WOMENS 50+ MULTI VITAMIN/MIN) TABS, Take  by mouth., Disp: , Rfl:   •  Blood Glucose Monitoring Suppl (ACCU-CHEK ADVANTAGE DIABETES)  "KIT, 1 Each by Does not apply route every day., Disp: 1 Each, Rfl: 0        Physical Examination:   Vital signs: /72   Pulse 88   Ht 1.626 m (5' 4.02\")   Wt 80.7 kg (178 lb)   SpO2 99%   BMI 30.54 kg/m²   General: No distress, cooperative, well dressed and well nourished.   Eyes: No scleral icterus or discharge, No hyposphagma  ENMT: Normal on external inspection of nose, lips, No nasal drainage   Neck: No abnormal masses on inspection  Resp: Normal effort, Bilateral clear to auscultation, No wheezing, No rales  CVS: Regular rate and rhythm, S1 S2 normal, No murmur. No gallop  Extremities: No edema bilateral extremities  Neuro: Alert and oriented  Skin: No rash, No Ulcers  Psych: Normal mood and affect      Assessment and Plan:    1. Controlled type 2 diabetes mellitus with diabetic nephropathy, unspecified whether long term insulin use (HCC)  STOP  1.  Glyburide/Metformin    START:  1.  Ozempic 0.25  2.  Xigduo 10/1000 one in the AM    2. Essential hypertension  BP is under very good control  Having severe Hypoglycemia    3. Obesity (BMI 30-39.9)  STOP Glyburide because of hypoglycemia and weight gain    4. Abd uncomfortable  In the epigastric region she has been uncomfortable when eating for one month.  I asked her to start on an OTC Nexium and I had my front office call her PCP to get her in to be seen for an investigation of this.  DDX gastric ulcer, an issues with the old stomach bipass, hernia ll of which need an appropriate work up.  I also explained that if this gets worse she is not to wait to see PCP and she is to go to the ER.     This patient is Glucose-toxic and has been for some time now, they are starting to have blurred vision which puts them at risk for blindness or other visual problems related to diabetes.   They also have polyuria which precludes them to move toward renal failure and possible dialysis.  I discussed today with this patient that the leading cause of adult blindness and " renal failure and the need for dialysis is uncontrolled diabetes and a glucose-toxic state.  I explained to them the need to get a better handle of their diabetes, because we want to avoid complications if at all possible.     The total time spent seeing this patient today face to face in consultation, and formulating an action plan for this visit was greater than 42 minutes. > Than 50% of this time was spent counseling, discussing problems documented above and below, coordinating care and answering questions by the physician assistant.  We developed a diabetes care plan for this patient today.    Return in about 3 weeks (around 7/24/2018).    Blood glucose log: Check BG in the morning when wake up, before lunch or dinner and before bed.  So three times a day.  Always bring BG diary to the next office visit.     Thank you kindly for allowing me to participate in the diabetes care plan for this patient.    Beto Street PA-C, BC-ADM  Board Certified - Advanced Diabetes Management  07/03/18    CC:   Damaris Soares P.A.-C.

## 2018-07-03 NOTE — PATIENT INSTRUCTIONS
STOP  1.  Glyburide/Metformin    START:  1.  Ozempic 0.25  2.  Xigduo 10/1000 one in the AM    Start OTC Nexium    Blood glucose log: Check BG in the morning when wake up, before lunch or dinner and before bed.  So three times a day.  Always bring BG diary to the next office visit.

## 2018-07-13 ENCOUNTER — OFFICE VISIT (OUTPATIENT)
Dept: MEDICAL GROUP | Facility: MEDICAL CENTER | Age: 63
End: 2018-07-13
Payer: COMMERCIAL

## 2018-07-13 VITALS
HEIGHT: 64 IN | WEIGHT: 173.5 LBS | HEART RATE: 72 BPM | DIASTOLIC BLOOD PRESSURE: 76 MMHG | RESPIRATION RATE: 18 BRPM | TEMPERATURE: 97 F | SYSTOLIC BLOOD PRESSURE: 110 MMHG | OXYGEN SATURATION: 94 % | BODY MASS INDEX: 29.62 KG/M2

## 2018-07-13 DIAGNOSIS — R53.83 FATIGUE, UNSPECIFIED TYPE: ICD-10-CM

## 2018-07-13 DIAGNOSIS — R10.13 EPIGASTRIC PAIN: ICD-10-CM

## 2018-07-13 DIAGNOSIS — R55 SYNCOPE, UNSPECIFIED SYNCOPE TYPE: ICD-10-CM

## 2018-07-13 DIAGNOSIS — R07.89 ATYPICAL CHEST PAIN: ICD-10-CM

## 2018-07-13 PROCEDURE — 99214 OFFICE O/P EST MOD 30 MIN: CPT | Performed by: PHYSICIAN ASSISTANT

## 2018-07-13 RX ORDER — DAPAGLIFLOZIN AND METFORMIN HYDROCHLORIDE 10; 1000 MG/1; MG/1
TABLET, FILM COATED, EXTENDED RELEASE ORAL
COMMUNITY
End: 2018-08-28

## 2018-07-13 RX ORDER — OMEPRAZOLE 40 MG/1
40 CAPSULE, DELAYED RELEASE ORAL DAILY
Qty: 30 CAP | Refills: 1 | Status: SHIPPED | OUTPATIENT
Start: 2018-07-13

## 2018-07-13 NOTE — PROGRESS NOTES
"Chief Complaint   Patient presents with   • GI Problem     upper belly   • Dizziness       HPI  Garry Coronel is a 62 y.o. female here today for New onset epigastric pain that radiates slightly to the L side X 3 wks. No NV,CD,   pain was constant for 3 wks and today started to feel better. Pain feels like soreness, 5-6/10.   Has tried 3 tums which help w symptoms for 3 mins and then symptoms started again.  Has his of bypass surgery 2006.  Denies any indigestion or acid reflux.  Pt has had some more fatigue and tiredness recently but states she is always tired because she wake up at night to take care of her mom. No leg swelling, no SOB or CP, palpitations or arrhythmias. Sunday she passed out standing up from the cough and fell and landed on her butt and was out. She lost consciousness, no loss of bladder or bowel control.  no memory loss and no dizziness since then. Non smoker    Past medical, surgical, family, and social history is reviewed in Epic chart by me today.   Medications and allergies reviewed and updated in Epic chart by me today.     ROS:   As documented in history of present illness above    Exam:  Blood pressure 110/76, pulse 72, temperature 36.1 °C (97 °F), resp. rate 18, height 1.626 m (5' 4\"), weight 78.7 kg (173 lb 8 oz), SpO2 94 %.  Constitutional: Alert, no distress, plus 3 vital signs  Skin:  Warm, dry, no rashes invisible areas  Eye: Equal, round and reactive, conjunctiva clear  Respiratory: Unlabored respiratory effort, lungs clear to auscultation, no wheezes, no rhonchi  Cardiovascular: RRR, no murmur, no lower extremities edema,    Abdomen: Soft, nontender, no masses or hepatosplenomegaly, no TTP, pain is not reproducible.  Psych: Alert, pleasant, well-groomed, normal affect    Last lab results were reviewed by me today and discussed w patient.    A/P:  1. Epigastric pain    - EKG --> normal EKG with regular rhythm and rate,  no ST elevations.  - omeprazole (PRILOSEC) 40 MG " delayed-release capsule; Take 1 Cap by mouth every day.  Dispense: 30 Cap; Refill: 1    2. Fatigue, unspecified type  - EKG    3. Atypical chest pain    - EKG  - CARDIAC STRESS TEST TREADMILL ONLY    4. Syncope, unspecified syncope type  - CARDIAC STRESS TEST TREADMILL ONLY  - CBC WITH DIFFERENTIAL; Future  - COMP METABOLIC PANEL; Future    Heart sounded RRR, normal EKG in office.  Given the history of gastric bypass is possible that this is heartburn.  Advised patient to start omeprazole and see if that helps improves her symptoms.    We discussed red flags incuding worsening pain, pressure, shortness of breath or overall decline in health. Patient verbalize understanding and will present to the emergency room.

## 2018-07-19 ENCOUNTER — PATIENT MESSAGE (OUTPATIENT)
Dept: MEDICAL GROUP | Facility: MEDICAL CENTER | Age: 63
End: 2018-07-19

## 2018-07-19 DIAGNOSIS — R42 DIZZINESS: ICD-10-CM

## 2018-07-19 DIAGNOSIS — R53.83 OTHER FATIGUE: ICD-10-CM

## 2018-07-19 DIAGNOSIS — R10.9 ABDOMINAL PAIN, UNSPECIFIED ABDOMINAL LOCATION: ICD-10-CM

## 2018-07-21 ENCOUNTER — HOSPITAL ENCOUNTER (OUTPATIENT)
Dept: LAB | Facility: MEDICAL CENTER | Age: 63
End: 2018-07-21
Attending: PHYSICIAN ASSISTANT
Payer: COMMERCIAL

## 2018-07-21 DIAGNOSIS — R42 DIZZINESS: ICD-10-CM

## 2018-07-21 LAB
ALBUMIN SERPL BCP-MCNC: 4.2 G/DL (ref 3.2–4.9)
ALBUMIN/GLOB SERPL: 1.3 G/DL
ALP SERPL-CCNC: 72 U/L (ref 30–99)
ALT SERPL-CCNC: 13 U/L (ref 2–50)
ANION GAP SERPL CALC-SCNC: 9 MMOL/L (ref 0–11.9)
AST SERPL-CCNC: 17 U/L (ref 12–45)
BASOPHILS # BLD AUTO: 0.8 % (ref 0–1.8)
BASOPHILS # BLD: 0.07 K/UL (ref 0–0.12)
BILIRUB SERPL-MCNC: 0.5 MG/DL (ref 0.1–1.5)
BUN SERPL-MCNC: 35 MG/DL (ref 8–22)
CALCIUM SERPL-MCNC: 9.5 MG/DL (ref 8.5–10.5)
CHLORIDE SERPL-SCNC: 107 MMOL/L (ref 96–112)
CO2 SERPL-SCNC: 25 MMOL/L (ref 20–33)
CREAT SERPL-MCNC: 1.67 MG/DL (ref 0.5–1.4)
EOSINOPHIL # BLD AUTO: 0.36 K/UL (ref 0–0.51)
EOSINOPHIL NFR BLD: 4.4 % (ref 0–6.9)
ERYTHROCYTE [DISTWIDTH] IN BLOOD BY AUTOMATED COUNT: 46.5 FL (ref 35.9–50)
GLOBULIN SER CALC-MCNC: 3.2 G/DL (ref 1.9–3.5)
GLUCOSE SERPL-MCNC: 117 MG/DL (ref 65–99)
HCT VFR BLD AUTO: 40.4 % (ref 37–47)
HGB BLD-MCNC: 12.5 G/DL (ref 12–16)
IMM GRANULOCYTES # BLD AUTO: 0.02 K/UL (ref 0–0.11)
IMM GRANULOCYTES NFR BLD AUTO: 0.2 % (ref 0–0.9)
LYMPHOCYTES # BLD AUTO: 2.68 K/UL (ref 1–4.8)
LYMPHOCYTES NFR BLD: 32.5 % (ref 22–41)
MCH RBC QN AUTO: 28.3 PG (ref 27–33)
MCHC RBC AUTO-ENTMCNC: 30.9 G/DL (ref 33.6–35)
MCV RBC AUTO: 91.6 FL (ref 81.4–97.8)
MONOCYTES # BLD AUTO: 0.43 K/UL (ref 0–0.85)
MONOCYTES NFR BLD AUTO: 5.2 % (ref 0–13.4)
NEUTROPHILS # BLD AUTO: 4.68 K/UL (ref 2–7.15)
NEUTROPHILS NFR BLD: 56.9 % (ref 44–72)
NRBC # BLD AUTO: 0 K/UL
NRBC BLD-RTO: 0 /100 WBC
PLATELET # BLD AUTO: 282 K/UL (ref 164–446)
PMV BLD AUTO: 11 FL (ref 9–12.9)
POTASSIUM SERPL-SCNC: 4 MMOL/L (ref 3.6–5.5)
PROT SERPL-MCNC: 7.4 G/DL (ref 6–8.2)
RBC # BLD AUTO: 4.41 M/UL (ref 4.2–5.4)
SODIUM SERPL-SCNC: 141 MMOL/L (ref 135–145)
TSH SERPL DL<=0.005 MIU/L-ACNC: 2.03 UIU/ML (ref 0.38–5.33)
WBC # BLD AUTO: 8.2 K/UL (ref 4.8–10.8)

## 2018-07-21 PROCEDURE — 36415 COLL VENOUS BLD VENIPUNCTURE: CPT

## 2018-07-21 PROCEDURE — 80053 COMPREHEN METABOLIC PANEL: CPT

## 2018-07-21 PROCEDURE — 84443 ASSAY THYROID STIM HORMONE: CPT

## 2018-07-21 PROCEDURE — 85025 COMPLETE CBC W/AUTO DIFF WBC: CPT

## 2018-07-24 ENCOUNTER — OFFICE VISIT (OUTPATIENT)
Dept: ENDOCRINOLOGY | Facility: MEDICAL CENTER | Age: 63
End: 2018-07-24
Payer: COMMERCIAL

## 2018-07-24 VITALS
BODY MASS INDEX: 29.12 KG/M2 | SYSTOLIC BLOOD PRESSURE: 84 MMHG | HEART RATE: 89 BPM | HEIGHT: 64 IN | WEIGHT: 170.6 LBS | DIASTOLIC BLOOD PRESSURE: 54 MMHG | OXYGEN SATURATION: 97 %

## 2018-07-24 DIAGNOSIS — I10 ESSENTIAL HYPERTENSION: ICD-10-CM

## 2018-07-24 DIAGNOSIS — E11.21 CONTROLLED TYPE 2 DIABETES MELLITUS WITH DIABETIC NEPHROPATHY, WITHOUT LONG-TERM CURRENT USE OF INSULIN (HCC): ICD-10-CM

## 2018-07-24 PROCEDURE — 99214 OFFICE O/P EST MOD 30 MIN: CPT | Performed by: PHYSICIAN ASSISTANT

## 2018-07-24 NOTE — PROGRESS NOTES
Return to office Patient Consult Note  Referred by: Damaris Soares P.A.-C.    Reason for consult: Diabetes Management Type 2    HPI:  Garry Coronel is a 62 y.o. old patient who is seeing us today for diabetes care.  This is a pleasant patient with diabetes and I appreciate the opportunity to participate in the care of this patient.    BG Diary:7/24/2018  In the AM: 119, 131, 91, 112, 110, 113, 128  Before meal:  Before Bed:    Labs of 5/8/18 HbA1c is 11.6, GFR 40, negative microalbumin     BG Diary:7/3/2018  In the AM:94, 146, 68, 88, 79, 67, 77, 49, 76     Weight: on 7/3/18 she was 178 and today she is 170      1. Controlled type 2 diabetes mellitus with diabetic nephropathy, without long-term current use of insulin ([facility])  This is a new patient with me on 7/3/18  She is on:  1.  Glyburide/Metformin     STOP  1.  Glyburide/Metformin     START:  1.  Ozempic 0.25  2.  Xigduo 10/1000 one in the AM    2. Essential hypertension  Hypotention    ROS:   Constitutional: No night sweats.  Eyes:  No visual changes.  Cardiac: No chest pain, No palpitations or racing heart rate.  Resp: No shortness of breath, No cough,   Gi: No Diarrhea    All other systems were reviewed and were/are negative.  The ROS was revised/revisited during this office visit from the patients first office visit with me on 7/3/18 Please review the full ROS during the first office visit.    Past Medical History:  Patient Active Problem List    Diagnosis Date Noted   • Dizziness 07/25/2018   • Orthostatic syncope 07/25/2018   • Precordial pain 07/25/2018   • Hypotension due to drugs 07/25/2018   • Chest pain 03/10/2017   • CKD stage 3 due to type 2 diabetes mellitus (HCC) 11/09/2015   • DM (diabetes mellitus) type II controlled with renal manifestation ([facility]) 11/09/2015   • Obesity (BMI 30-39.9) 10/09/2014   • Vitamin D deficiency 12/27/2013   • B12 deficiency 02/07/2013   • Dyslipidemia 02/07/2013   • HTN (hypertension) 01/24/2013   • Sleep apnea  01/24/2013       Past Surgical History:  Past Surgical History:   Procedure Laterality Date   • GASTRIC BYPASS LAPAROSCOPIC  12/13/2006       Allergies:  Maxzide [hydrochlorothiazide w-triamterene]    Social History:  Social History     Social History   • Marital status:      Spouse name: N/A   • Number of children: N/A   • Years of education: N/A     Occupational History   • Not on file.     Social History Main Topics   • Smoking status: Former Smoker     Types: Cigarettes     Quit date: 1/24/1978   • Smokeless tobacco: Never Used   • Alcohol use Yes   • Drug use: No   • Sexual activity: No     Other Topics Concern   • Not on file     Social History Narrative   • No narrative on file       Family History:  Family History   Problem Relation Age of Onset   • Diabetes Mother    • Hyperlipidemia Mother    • Diabetes Brother    • Diabetes Maternal Aunt    • Heart Disease Maternal Grandfather    • Stroke Maternal Grandfather    • Cancer Paternal Grandfather        Medications:    Current Outpatient Prescriptions:   •  Semaglutide 0.25 or 0.5 MG/DOSE Solution Pen-injector, Inject  as instructed., Disp: , Rfl:   •  Dapagliflozin-Metformin HCl ER (XIGDUO XR)  MG TABLET SR 24 HR, Take  by mouth., Disp: , Rfl:   •  omeprazole (PRILOSEC) 40 MG delayed-release capsule, Take 1 Cap by mouth every day., Disp: 30 Cap, Rfl: 1  •  Docosahexaenoic Acid (PRENATAL DHA) 200 MG Cap, Take  by mouth., Disp: , Rfl:   •  atorvastatin (LIPITOR) 10 MG Tab, TAKE ONE TABLET BY MOUTH ONCE DAILY, Disp: 90 Tab, Rfl: 0  •  vitamin D (CHOLECALCIFEROL) 1000 UNIT TABS, Take 2,000 Units by mouth every day., Disp: , Rfl:   •  cyanocobalamin (VITAMIN B-12) 500 MCG TABS, Take 500 mcg by mouth every 48 hours., Disp: , Rfl:   •  Blood Glucose Monitoring Suppl (ACCU-CHEK ADVANTAGE DIABETES) KIT, 1 Each by Does not apply route every day., Disp: 1 Each, Rfl: 0        Physical Examination:   Vital signs: BP (!) 84/54   Pulse 89   Ht 1.626 m (5'  "4.02\")   Wt 77.4 kg (170 lb 9.6 oz)   SpO2 97%   BMI 29.27 kg/m²   General: No distress, cooperative, well dressed and well nourished.   Eyes: No scleral icterus or discharge, No hyposphagma  ENMT: Normal on external inspection of nose, lips, No nasal drainage   Neck: No abnormal masses on inspection  Resp: Normal effort, Bilateral clear to auscultation, No wheezing, No rales  CVS: Regular rate and rhythm, S1 S2 normal, No murmur. No gallop  Extremities: No edema bilateral extremities  Neuro: Alert and oriented  Skin: No rash, No Ulcers  Psych: Normal mood and affect      Assessment and Plan:    1. Controlled type 2 diabetes mellitus with diabetic nephropathy, without long-term current use of insulin (HCC)    START:  1.  Ozempic 0.25 (INCREASE to 0.5)  2.  Xigduo 10/1000 one in the AM (Stop) GFR has dropped    2. Essential hypertension  This is too low the SGLT2 is helping lower her BP at this point she does not have protein in her Urine.   STOP Lisinopril/HCTZ      Return in about 3 weeks (around 8/14/2018).    Blood glucose log: Check BG in the morning when wake up, before lunch or dinner and before bed.  So three times a day.  Always bring BG diary to the next office visit.     Thank you kindly for allowing me to participate in the diabetes care plan for this patient.    Beto Street PA-C, BC-ADM  Board Certified - Advanced Diabetes Management  07/24/18    CC:   Damaris Soares P.A.-C.    "

## 2018-07-24 NOTE — PATIENT INSTRUCTIONS
START:  1.  Ozempic 0.25 (INCREASE to 0.5)  2.  Xigduo 10/1000 one in the AM    STOP Lisinopril/HCTZ

## 2018-07-25 ENCOUNTER — OFFICE VISIT (OUTPATIENT)
Dept: CARDIOLOGY | Facility: MEDICAL CENTER | Age: 63
End: 2018-07-25
Payer: COMMERCIAL

## 2018-07-25 ENCOUNTER — TELEPHONE (OUTPATIENT)
Dept: MEDICAL GROUP | Facility: MEDICAL CENTER | Age: 63
End: 2018-07-25

## 2018-07-25 VITALS
BODY MASS INDEX: 29.19 KG/M2 | WEIGHT: 171 LBS | HEART RATE: 72 BPM | SYSTOLIC BLOOD PRESSURE: 110 MMHG | DIASTOLIC BLOOD PRESSURE: 70 MMHG | HEIGHT: 64 IN | OXYGEN SATURATION: 98 %

## 2018-07-25 DIAGNOSIS — N18.30 CKD (CHRONIC KIDNEY DISEASE) STAGE 3, GFR 30-59 ML/MIN (HCC): ICD-10-CM

## 2018-07-25 DIAGNOSIS — I95.1 ORTHOSTATIC SYNCOPE: ICD-10-CM

## 2018-07-25 DIAGNOSIS — I95.2 HYPOTENSION DUE TO DRUGS: ICD-10-CM

## 2018-07-25 DIAGNOSIS — R07.2 PRECORDIAL PAIN: ICD-10-CM

## 2018-07-25 DIAGNOSIS — R42 DIZZINESS: ICD-10-CM

## 2018-07-25 PROCEDURE — 99204 OFFICE O/P NEW MOD 45 MIN: CPT | Performed by: INTERNAL MEDICINE

## 2018-07-25 ASSESSMENT — ENCOUNTER SYMPTOMS
FEVER: 0
LOSS OF CONSCIOUSNESS: 1
MYALGIAS: 0
ORTHOPNEA: 0
PND: 0
SHORTNESS OF BREATH: 0
DIZZINESS: 1
WEAKNESS: 1
PALPITATIONS: 0
CHILLS: 0
BLURRED VISION: 0
PHOTOPHOBIA: 1
ABDOMINAL PAIN: 0
INSOMNIA: 0

## 2018-07-25 NOTE — LETTER
[facility]   [address] [place] [address]  [place] [address]  Phone: [phone]  Fax: [phone]              Garry Coronel  1955    Encounter Date: 7/25/2018    Edi Abdul M.D.          PROGRESS NOTE:  Chief Complaint   Patient presents with   • HTN (Uncontrolled)     NP DX:DIZZINESS/FATIGUE       Subjective:   Garry Coronel is a 62 y.o. female who presents today referred by her PCP Damaris Soares PAC cardiology consultation for evaluation of dizziness.    The patient has a history of multiple medical problems including poorly controlled diabetes mellitus, hypertension, hyperlipidemia, obesity, gastric bypass surgery 2006, sleep apnea intermittently on CPAP currently not being treated ×10 years, CKD and vitamin B12 deficiency.    The patient reports a 2-3 week history of frequent dizziness throughout the day with or without positional changes.  Notably, on 7/8/2018 she was cleaning the floor when she got up and walked a few feet, passed out without any premonitory symptoms and woke up on the floor.  She sustained no major injury.  Since that time she is continued to have frequent episodes of dizziness associated with fullness in both ears.  No palpitations, anginal chest pain or shortness of breath.  No further syncope.    Additionally prior to her episodes of dizziness she had noted a constant 3 week period of persistent subxiphoid pain with some slight radiation to her left breast with no particular aggravating symptoms.    The patient saw her PCP on 7/13.  A stress test has been ordered scheduled for early next month.    Yesterday 7/24/2018 she saw her endocrinologist Dr. Isael Street.  Blood pressure was 84/54 Dr. Street discontinued the patient's lisinopril.    Significantly the patient is constantly fatigued and tired.  She is caring for her elderly mother who wakes her up 3 times a night.  Additionally she is not sleeping well  because she is not on CPAP machine.    The patient has no prior history of heart disease.  She had a previous normal stress test in March, 2017.    Past Medical History:   Diagnosis Date   • Hypertension    • Sleep apnea      Past Surgical History:   Procedure Laterality Date   • GASTRIC BYPASS LAPAROSCOPIC  12/13/2006     Family History   Problem Relation Age of Onset   • Diabetes Mother    • Hyperlipidemia Mother    • Diabetes Brother    • Diabetes Maternal Aunt    • Heart Disease Maternal Grandfather    • Stroke Maternal Grandfather    • Cancer Paternal Grandfather      Social History     Social History   • Marital status:      Spouse name: N/A   • Number of children: N/A   • Years of education: N/A     Occupational History   • Not on file.     Social History Main Topics   • Smoking status: Former Smoker     Types: Cigarettes     Quit date: 1/24/1978   • Smokeless tobacco: Never Used   • Alcohol use Yes   • Drug use: No   • Sexual activity: No     Other Topics Concern   • Not on file     Social History Narrative   • No narrative on file     Allergies   Allergen Reactions   • Maxzide [Hydrochlorothiazide W-Triamterene]      Outpatient Encounter Prescriptions as of 7/25/2018   Medication Sig Dispense Refill   • Semaglutide 0.25 or 0.5 MG/DOSE Solution Pen-injector Inject  as instructed.     • Dapagliflozin-Metformin HCl ER (XIGDUO XR)  MG TABLET SR 24 HR Take  by mouth.     • omeprazole (PRILOSEC) 40 MG delayed-release capsule Take 1 Cap by mouth every day. 30 Cap 1   • Docosahexaenoic Acid (PRENATAL DHA) 200 MG Cap Take  by mouth.     • atorvastatin (LIPITOR) 10 MG Tab TAKE ONE TABLET BY MOUTH ONCE DAILY 90 Tab 0   • vitamin D (CHOLECALCIFEROL) 1000 UNIT TABS Take 2,000 Units by mouth every day.     • cyanocobalamin (VITAMIN B-12) 500 MCG TABS Take 500 mcg by mouth every 48 hours.     • Blood Glucose Monitoring Suppl (ACCU-CHEK ADVANTAGE DIABETES) KIT 1 Each by Does not apply route every day. 1 Each  "0     No facility-administered encounter medications on file as of 7/25/2018.      Review of Systems   Constitutional: Positive for malaise/fatigue. Negative for chills and fever.   HENT: Negative for congestion.    Eyes: Positive for photophobia. Negative for blurred vision.   Respiratory: Negative for shortness of breath.    Cardiovascular: Positive for chest pain. Negative for palpitations, orthopnea, leg swelling and PND.   Gastrointestinal: Negative for abdominal pain.   Genitourinary: Negative for dysuria.   Musculoskeletal: Negative for joint pain and myalgias.   Skin: Negative for rash.   Neurological: Positive for dizziness, loss of consciousness and weakness.   Psychiatric/Behavioral: The patient does not have insomnia.         Objective:   /70   Pulse 72   Ht 1.626 m (5' 4.02\")   Wt 77.6 kg (171 lb)   SpO2 98%   BMI 29.34 kg/m²      Physical Exam   Constitutional: She is oriented to person, place, and time. She appears well-developed and well-nourished.   HENT:   Head: Normocephalic and atraumatic.   Eyes: Pupils are equal, round, and reactive to light. EOM are normal. No scleral icterus.   Neck: Neck supple. No JVD present. No thyromegaly present.   Cardiovascular: Normal rate, regular rhythm, normal heart sounds and intact distal pulses.  Exam reveals no gallop and no friction rub.    No murmur heard.  Pulmonary/Chest: Effort normal and breath sounds normal. No respiratory distress. She has no wheezes. She has no rales.   Abdominal: Soft. Bowel sounds are normal. She exhibits no mass. There is no tenderness.   Markedly protuberant   Musculoskeletal: Normal range of motion. She exhibits no edema or deformity.   Lymphadenopathy:     She has no cervical adenopathy.   Neurological: She is alert and oriented to person, place, and time. No cranial nerve deficit. She exhibits normal muscle tone.   Skin: Skin is warm and dry.   Psychiatric: She has a normal mood and affect. Her behavior is normal.    "     Recent EKG tracings, previous stress test in 2017 and recent blood work this month was reviewed.    Assessment:     1. Dizziness     2. Orthostatic syncope     3. Precordial pain     4. Hypotension due to drugs         Medical Decision Making:  Today's Assessment / Status / Plan:     1.  Dizziness and orthostatic syncope very likely related to volume depletion superimposed on medications related to lisinopril which is now been discontinued as of yesterday.  2.  Atypical chest pain with subxiphoid pain.  A cardiac stress test is scheduled in the near future for evaluation of this.    Recommendation Discussion  1.  I reviewed her current cardiac condition and situation related to her symptoms of dizziness, syncope and atypical chest pain.  2.  Will await the results of the stress test.  3.  We will see if her dizziness resolves after discontinuation of lisinopril.  4.  Stress to maintain daily hydration.  5.  Optimally the patient needs more sleep.  Thank you for allowing me to see this lady in consultation.      ELIZABETH Meza.AlexeiC.  [address]  [address] [place] [id number]  VIA In Basket

## 2018-07-25 NOTE — TELEPHONE ENCOUNTER
Talked to patient over the phone and informed her due to low GFR 30 she should stop Xigduo,  she can continue the injectable for diabetes.  Patient verbalized understanding. She does not have a nephrologist.  I am going to refer patient to see a nephrologist as well.    Damaris Soares P.A.-C.

## 2018-07-25 NOTE — PROGRESS NOTES
Chief Complaint   Patient presents with   • HTN (Uncontrolled)     NP DX:DIZZINESS/FATIGUE       Subjective:   Garry Coronel is a 62 y.o. female who presents today referred by her PCP Damaris Soares PAC cardiology consultation for evaluation of dizziness.    The patient has a history of multiple medical problems including poorly controlled diabetes mellitus, hypertension, hyperlipidemia, obesity, gastric bypass surgery 2006, sleep apnea intermittently on CPAP currently not being treated ×10 years, CKD and vitamin B12 deficiency.    The patient reports a 2-3 week history of frequent dizziness throughout the day with or without positional changes.  Notably, on 7/8/2018 she was cleaning the floor when she got up and walked a few feet, passed out without any premonitory symptoms and woke up on the floor.  She sustained no major injury.  Since that time she is continued to have frequent episodes of dizziness associated with fullness in both ears.  No palpitations, anginal chest pain or shortness of breath.  No further syncope.    Additionally prior to her episodes of dizziness she had noted a constant 3 week period of persistent subxiphoid pain with some slight radiation to her left breast with no particular aggravating symptoms.    The patient saw her PCP on 7/13.  A stress test has been ordered scheduled for early next month.    Yesterday 7/24/2018 she saw her endocrinologist Dr. Isael Street.  Blood pressure was 84/54 Dr. Street discontinued the patient's lisinopril.    Significantly the patient is constantly fatigued and tired.  She is caring for her elderly mother who wakes her up 3 times a night.  Additionally she is not sleeping well because she is not on CPAP machine.    The patient has no prior history of heart disease.  She had a previous normal stress test in March, 2017.    Past Medical History:   Diagnosis Date   • Hypertension    • Sleep apnea      Past Surgical History:   Procedure Laterality  Date   • GASTRIC BYPASS LAPAROSCOPIC  12/13/2006     Family History   Problem Relation Age of Onset   • Diabetes Mother    • Hyperlipidemia Mother    • Diabetes Brother    • Diabetes Maternal Aunt    • Heart Disease Maternal Grandfather    • Stroke Maternal Grandfather    • Cancer Paternal Grandfather      Social History     Social History   • Marital status:      Spouse name: N/A   • Number of children: N/A   • Years of education: N/A     Occupational History   • Not on file.     Social History Main Topics   • Smoking status: Former Smoker     Types: Cigarettes     Quit date: 1/24/1978   • Smokeless tobacco: Never Used   • Alcohol use Yes   • Drug use: No   • Sexual activity: No     Other Topics Concern   • Not on file     Social History Narrative   • No narrative on file     Allergies   Allergen Reactions   • Maxzide [Hydrochlorothiazide W-Triamterene]      Outpatient Encounter Prescriptions as of 7/25/2018   Medication Sig Dispense Refill   • Semaglutide 0.25 or 0.5 MG/DOSE Solution Pen-injector Inject  as instructed.     • Dapagliflozin-Metformin HCl ER (XIGDUO XR)  MG TABLET SR 24 HR Take  by mouth.     • omeprazole (PRILOSEC) 40 MG delayed-release capsule Take 1 Cap by mouth every day. 30 Cap 1   • Docosahexaenoic Acid (PRENATAL DHA) 200 MG Cap Take  by mouth.     • atorvastatin (LIPITOR) 10 MG Tab TAKE ONE TABLET BY MOUTH ONCE DAILY 90 Tab 0   • vitamin D (CHOLECALCIFEROL) 1000 UNIT TABS Take 2,000 Units by mouth every day.     • cyanocobalamin (VITAMIN B-12) 500 MCG TABS Take 500 mcg by mouth every 48 hours.     • Blood Glucose Monitoring Suppl (ACCU-CHEK ADVANTAGE DIABETES) KIT 1 Each by Does not apply route every day. 1 Each 0     No facility-administered encounter medications on file as of 7/25/2018.      Review of Systems   Constitutional: Positive for malaise/fatigue. Negative for chills and fever.   HENT: Negative for congestion.    Eyes: Positive for photophobia. Negative for blurred  "vision.   Respiratory: Negative for shortness of breath.    Cardiovascular: Positive for chest pain. Negative for palpitations, orthopnea, leg swelling and PND.   Gastrointestinal: Negative for abdominal pain.   Genitourinary: Negative for dysuria.   Musculoskeletal: Negative for joint pain and myalgias.   Skin: Negative for rash.   Neurological: Positive for dizziness, loss of consciousness and weakness.   Psychiatric/Behavioral: The patient does not have insomnia.         Objective:   /70   Pulse 72   Ht 1.626 m (5' 4.02\")   Wt 77.6 kg (171 lb)   SpO2 98%   BMI 29.34 kg/m²     Physical Exam   Constitutional: She is oriented to person, place, and time. She appears well-developed and well-nourished.   HENT:   Head: Normocephalic and atraumatic.   Eyes: Pupils are equal, round, and reactive to light. EOM are normal. No scleral icterus.   Neck: Neck supple. No JVD present. No thyromegaly present.   Cardiovascular: Normal rate, regular rhythm, normal heart sounds and intact distal pulses.  Exam reveals no gallop and no friction rub.    No murmur heard.  Pulmonary/Chest: Effort normal and breath sounds normal. No respiratory distress. She has no wheezes. She has no rales.   Abdominal: Soft. Bowel sounds are normal. She exhibits no mass. There is no tenderness.   Markedly protuberant   Musculoskeletal: Normal range of motion. She exhibits no edema or deformity.   Lymphadenopathy:     She has no cervical adenopathy.   Neurological: She is alert and oriented to person, place, and time. No cranial nerve deficit. She exhibits normal muscle tone.   Skin: Skin is warm and dry.   Psychiatric: She has a normal mood and affect. Her behavior is normal.     Recent EKG tracings, previous stress test in 2017 and recent blood work this month was reviewed.    Assessment:     1. Dizziness     2. Orthostatic syncope     3. Precordial pain     4. Hypotension due to drugs         Medical Decision Making:  Today's Assessment / " Status / Plan:     1.  Dizziness and orthostatic syncope very likely related to volume depletion superimposed on medications related to lisinopril which is now been discontinued as of yesterday.  2.  Atypical chest pain with subxiphoid pain.  A cardiac stress test is scheduled in the near future for evaluation of this.    Recommendation Discussion  1.  I reviewed her current cardiac condition and situation related to her symptoms of dizziness, syncope and atypical chest pain.  2.  Will await the results of the stress test.  3.  We will see if her dizziness resolves after discontinuation of lisinopril.  4.  Stress to maintain daily hydration.  5.  Optimally the patient needs more sleep.  Thank you for allowing me to see this lady in consultation.
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
## 2018-07-25 NOTE — TELEPHONE ENCOUNTER
Please inform patient that I talked to Beto Street, Due to low GFR and BP she needs to stop xigduo and lisinopril.    Damaris Soares P.A.-C.

## 2018-07-31 ENCOUNTER — HOSPITAL ENCOUNTER (OUTPATIENT)
Facility: MEDICAL CENTER | Age: 63
End: 2018-07-31
Attending: PHYSICIAN ASSISTANT
Payer: COMMERCIAL

## 2018-07-31 DIAGNOSIS — R10.9 ABDOMINAL PAIN, UNSPECIFIED ABDOMINAL LOCATION: ICD-10-CM

## 2018-07-31 LAB — HEMOCCULT STL QL: NEGATIVE

## 2018-07-31 PROCEDURE — 82272 OCCULT BLD FECES 1-3 TESTS: CPT

## 2018-08-03 ENCOUNTER — NON-PROVIDER VISIT (OUTPATIENT)
Dept: CARDIOLOGY | Facility: MEDICAL CENTER | Age: 63
End: 2018-08-03
Attending: PHYSICIAN ASSISTANT
Payer: COMMERCIAL

## 2018-08-03 ENCOUNTER — HOSPITAL ENCOUNTER (OUTPATIENT)
Dept: RADIOLOGY | Facility: MEDICAL CENTER | Age: 63
End: 2018-08-03
Attending: PHYSICIAN ASSISTANT
Payer: COMMERCIAL

## 2018-08-03 VITALS
BODY MASS INDEX: 30.3 KG/M2 | WEIGHT: 171 LBS | OXYGEN SATURATION: 96 % | HEIGHT: 63 IN | SYSTOLIC BLOOD PRESSURE: 126 MMHG | HEART RATE: 69 BPM | DIASTOLIC BLOOD PRESSURE: 70 MMHG

## 2018-08-03 DIAGNOSIS — R55 SYNCOPE AND COLLAPSE: ICD-10-CM

## 2018-08-03 DIAGNOSIS — R10.9 ABDOMINAL PAIN, UNSPECIFIED ABDOMINAL LOCATION: ICD-10-CM

## 2018-08-03 DIAGNOSIS — R42 DIZZINESS: ICD-10-CM

## 2018-08-03 LAB — TREADMILL STRESS: NORMAL

## 2018-08-03 PROCEDURE — 74019 RADEX ABDOMEN 2 VIEWS: CPT

## 2018-08-03 PROCEDURE — 93880 EXTRACRANIAL BILAT STUDY: CPT

## 2018-08-03 PROCEDURE — 93015 CV STRESS TEST SUPVJ I&R: CPT | Performed by: INTERNAL MEDICINE

## 2018-08-08 ENCOUNTER — OFFICE VISIT (OUTPATIENT)
Dept: MEDICAL GROUP | Facility: MEDICAL CENTER | Age: 63
End: 2018-08-08
Payer: COMMERCIAL

## 2018-08-08 VITALS
SYSTOLIC BLOOD PRESSURE: 120 MMHG | HEART RATE: 71 BPM | DIASTOLIC BLOOD PRESSURE: 82 MMHG | OXYGEN SATURATION: 97 % | WEIGHT: 172.73 LBS | TEMPERATURE: 97.1 F | BODY MASS INDEX: 29.49 KG/M2 | HEIGHT: 64 IN

## 2018-08-08 DIAGNOSIS — E11.21 CONTROLLED TYPE 2 DIABETES MELLITUS WITH DIABETIC NEPHROPATHY, WITHOUT LONG-TERM CURRENT USE OF INSULIN (HCC): ICD-10-CM

## 2018-08-08 DIAGNOSIS — E11.22 CKD STAGE 3 DUE TO TYPE 2 DIABETES MELLITUS (HCC): ICD-10-CM

## 2018-08-08 DIAGNOSIS — I77.1 SUBCLAVIAN ARTERY STENOSIS, RIGHT (HCC): ICD-10-CM

## 2018-08-08 DIAGNOSIS — N18.30 CKD STAGE 3 DUE TO TYPE 2 DIABETES MELLITUS (HCC): ICD-10-CM

## 2018-08-08 DIAGNOSIS — I95.2 HYPOTENSION DUE TO DRUGS: ICD-10-CM

## 2018-08-08 PROBLEM — R42 DIZZINESS: Status: RESOLVED | Noted: 2018-07-25 | Resolved: 2018-08-08

## 2018-08-08 LAB
HBA1C MFR BLD: 6.5 % (ref ?–5.8)
INT CON NEG: NORMAL
INT CON POS: NORMAL

## 2018-08-08 PROCEDURE — 99214 OFFICE O/P EST MOD 30 MIN: CPT | Performed by: PHYSICIAN ASSISTANT

## 2018-08-08 PROCEDURE — 83036 HEMOGLOBIN GLYCOSYLATED A1C: CPT | Performed by: PHYSICIAN ASSISTANT

## 2018-08-08 NOTE — PROGRESS NOTES
Subjective:   CC:   Chief Complaint   Patient presents with   • F/u on DM, Results     US, Stress Test & Labs         Garry Coronel is a 62 y.o. female here today for f/u:  We stopped metformin- Dapagliflozin due to low GFR 31.  Pt continue only on injectable Samaglutide w FBG around 130. Sees nephrologist on 8/14 and DM specialist the same day.  Patient states all symptoms of dizziness has resolved since we have stopped blood pressure medicine and apparently flossing.  Her BP is back to normal.  Patient is currently feeling good.  Patient states her right arm has elevated BP reading compared to left.  Patient's carotid ultrasound was reviewed by me today.  Patient has mild R subclavian artery stenosis.     Current medicines (including changes today)  Current Outpatient Prescriptions   Medication Sig Dispense Refill   • omeprazole (PRILOSEC) 40 MG delayed-release capsule Take 1 Cap by mouth every day. 30 Cap 1   • Docosahexaenoic Acid (PRENATAL DHA) 200 MG Cap Take  by mouth.     • atorvastatin (LIPITOR) 10 MG Tab TAKE ONE TABLET BY MOUTH ONCE DAILY 90 Tab 0   • vitamin D (CHOLECALCIFEROL) 1000 UNIT TABS Take 2,000 Units by mouth every day.     • cyanocobalamin (VITAMIN B-12) 500 MCG TABS Take 500 mcg by mouth every 48 hours.     • Blood Glucose Monitoring Suppl (ACCU-CHEK ADVANTAGE DIABETES) KIT 1 Each by Does not apply route every day. 1 Each 0   • Semaglutide 0.25 or 0.5 MG/DOSE Solution Pen-injector Inject  as instructed.     • Dapagliflozin-Metformin HCl ER (XIGDUO XR)  MG TABLET SR 24 HR Take  by mouth.       No current facility-administered medications for this visit.          Past medical, surgical, family, and social history are reviewed in Epic chart by me today.   Medications and allergies reviewed in Epic chart by me today.         ROS  No chest pain, no shortness of breath, no abdominal pain  As documented in history of present illness above     Objective:     Blood pressure 120/82, pulse 71,  "temperature 36.2 °C (97.1 °F), height 1.626 m (5' 4.02\"), weight 78.4 kg (172 lb 11.7 oz), SpO2 97 %, not currently breastfeeding. Body mass index is 29.63 kg/m².   Physical Exam:  Constitutional: Alert, oriented in no acute distress.  Psych: Eye contact is good, speech goal directed, affect calm  Eyes: Conjunctiva non-injected, sclera non-icteric.  Lungs: Unlabored respiratory effort, clear to auscultation bilaterally with good excursion, no wheez or rhonci  CV: regular rate and rhythm. No lower extremity edema  Skin: no lesions in visible areas.        Assessment and Plan:   The following treatment plan was discussed    1. Subclavian artery stenosis, right (HCC)  Only mild stenosis, patient continues on atorvastatin 10 mg daily    2. CKD stage 3 due to type 2 diabetes mellitus ([facility])  Follow-up with nephrologist next week.  Stay off metformin- dapagliflozin    3. Controlled type 2 diabetes mellitus with diabetic nephropathy, without long-term current use of insulin ([facility])  The patient has good A1c, however her diabetes medicine was recently discontinued.  We will recheck her blood sugar in about 10-12 weeks.  Discussed possibly  adding Actos.  Continue injectable for now.    - POCT  A1C --> 6.5    4. Hypotension due to drugs  Since we have stopped blood pressure medicine and dapagliflozin hypotension has resolved and so has resolved dizziness      Followup: Return in about 10 weeks (around 10/17/2018), or DM.         Please note that this dictation was created using voice recognition software. I have made every reasonable attempt to correct obvious errors, but I expect that there are errors of grammar and possibly content that I did not discover before finalizing the note.    "

## 2018-08-14 ENCOUNTER — OFFICE VISIT (OUTPATIENT)
Dept: NEPHROLOGY | Facility: MEDICAL CENTER | Age: 63
End: 2018-08-14
Payer: COMMERCIAL

## 2018-08-14 VITALS
HEART RATE: 78 BPM | WEIGHT: 173 LBS | TEMPERATURE: 98.6 F | BODY MASS INDEX: 30.65 KG/M2 | DIASTOLIC BLOOD PRESSURE: 72 MMHG | SYSTOLIC BLOOD PRESSURE: 110 MMHG | OXYGEN SATURATION: 99 % | HEIGHT: 63 IN | RESPIRATION RATE: 14 BRPM

## 2018-08-14 DIAGNOSIS — N17.9 AKI (ACUTE KIDNEY INJURY) (HCC): ICD-10-CM

## 2018-08-14 DIAGNOSIS — I10 ESSENTIAL HYPERTENSION: ICD-10-CM

## 2018-08-14 DIAGNOSIS — N18.30 CKD (CHRONIC KIDNEY DISEASE) STAGE 3, GFR 30-59 ML/MIN (HCC): ICD-10-CM

## 2018-08-14 DIAGNOSIS — E11.8 TYPE 2 DIABETES MELLITUS WITH COMPLICATION, WITHOUT LONG-TERM CURRENT USE OF INSULIN (HCC): ICD-10-CM

## 2018-08-14 PROCEDURE — 99204 OFFICE O/P NEW MOD 45 MIN: CPT | Performed by: INTERNAL MEDICINE

## 2018-08-14 ASSESSMENT — ENCOUNTER SYMPTOMS
NERVOUS/ANXIOUS: 1
ABDOMINAL PAIN: 0
HYPERTENSION: 1
SHORTNESS OF BREATH: 0
WEAKNESS: 0
VOMITING: 0
COUGH: 0
FEVER: 0
CHILLS: 0
NAUSEA: 0

## 2018-08-14 NOTE — PROGRESS NOTES
Subjective:      Garry Coronel is a 62 y.o. female who presents with Hypertension and Chronic Kidney Disease            The patient is a pleasant 62-year-old lady with a past medical history significant for long-standing diabetes, long-standing hypertension, was being treated with ACE inhibitor, lately she has been on a diet for weight loss, and she started having some presyncopal episode which was attributed to hypotension, also her recent labs show acute kidney injury, her ACE inhibitor was stopped, patient feels better on blood pressure is better.  No recent use of NSAIDs or IV contrast      Hypertension   This is a chronic problem. The current episode started more than 1 year ago. The problem has been waxing and waning since onset. The problem is controlled. Pertinent negatives include no chest pain, peripheral edema or shortness of breath. There are no associated agents to hypertension. Risk factors for coronary artery disease include diabetes mellitus. Past treatments include nothing. The current treatment provides significant improvement. There are no compliance problems.  Hypertensive end-organ damage includes kidney disease. Identifiable causes of hypertension include chronic renal disease.   Chronic Kidney Disease   This is a chronic problem. The current episode started more than 1 year ago. The problem occurs constantly. The problem has been rapidly worsening. Pertinent negatives include no abdominal pain, chest pain, chills, coughing, fever, nausea, urinary symptoms, vomiting or weakness. Exacerbated by: hypotension.       Review of Systems   Constitutional: Negative for chills and fever.   Respiratory: Negative for cough and shortness of breath.    Cardiovascular: Negative for chest pain and leg swelling.   Gastrointestinal: Negative for abdominal pain, nausea and vomiting.   Genitourinary: Negative for dysuria, frequency, hematuria and urgency.   Neurological: Negative for weakness.  "  Psychiatric/Behavioral: The patient is nervous/anxious.    All other systems reviewed and are negative.         Objective:     /72   Pulse 78   Temp 37 °C (98.6 °F)   Resp 14   Ht 1.6 m (5' 3\")   Wt 78.5 kg (173 lb)   SpO2 99%   BMI 30.65 kg/m²      Physical Exam   Constitutional: She is oriented to person, place, and time. She appears well-developed and well-nourished. No distress.   HENT:   Right Ear: External ear normal.   Left Ear: External ear normal.   Nose: Nose normal.   Mouth/Throat: Oropharynx is clear and moist.   Eyes: Pupils are equal, round, and reactive to light. Conjunctivae are normal. Right eye exhibits no discharge. Left eye exhibits no discharge. No scleral icterus.   Neck: Neck supple. No JVD present. No tracheal deviation present. No thyromegaly present.   Cardiovascular: Normal rate, regular rhythm and normal heart sounds.  Exam reveals no friction rub.    No murmur heard.  Pulmonary/Chest: Effort normal and breath sounds normal. No stridor. No respiratory distress. She has no wheezes. She has no rales.   Abdominal: Soft. Bowel sounds are normal.   Musculoskeletal: She exhibits no edema or tenderness.   Lymphadenopathy:     She has no cervical adenopathy.   Neurological: She is alert and oriented to person, place, and time. No cranial nerve deficit.   Skin: Skin is warm and dry. She is not diaphoretic. No erythema.   Psychiatric: She has a normal mood and affect. Her behavior is normal. Thought content normal.   Nursing note and vitals reviewed.              Assessment/Plan:     1. Essential hypertension  Blood pressure is controlled without medication  Continue low-sodium diet  Check blood pressure at home regularly    2. KRANTHI (acute kidney injury) (HCC)  Etiology is most likely prerenal component secondary to hypotension  Patient has no uremic symptoms  No acute need for dialysis  Repeat labs in 4-6 weeks    3. CKD (chronic kidney disease) stage 3, GFR 30-59 ml/min  No clear " etiology  I doubt diabetic nephropathy because of the lack of proteinuria  Recheck labs  Renal dose all medication  Avoid nephrotoxins    4. Type 2 diabetes mellitus with complication, without long-term current use of insulin (HCC)  Better controlled

## 2018-08-16 ENCOUNTER — APPOINTMENT (OUTPATIENT)
Dept: ENDOCRINOLOGY | Facility: MEDICAL CENTER | Age: 63
End: 2018-08-16
Payer: COMMERCIAL

## 2018-08-28 ENCOUNTER — OFFICE VISIT (OUTPATIENT)
Dept: ENDOCRINOLOGY | Facility: MEDICAL CENTER | Age: 63
End: 2018-08-28
Payer: COMMERCIAL

## 2018-08-28 VITALS
SYSTOLIC BLOOD PRESSURE: 144 MMHG | WEIGHT: 170.6 LBS | BODY MASS INDEX: 30.23 KG/M2 | DIASTOLIC BLOOD PRESSURE: 90 MMHG | HEIGHT: 63 IN | OXYGEN SATURATION: 98 % | HEART RATE: 79 BPM

## 2018-08-28 DIAGNOSIS — I10 ESSENTIAL HYPERTENSION: ICD-10-CM

## 2018-08-28 DIAGNOSIS — E11.21 CONTROLLED TYPE 2 DIABETES MELLITUS WITH DIABETIC NEPHROPATHY, WITHOUT LONG-TERM CURRENT USE OF INSULIN (HCC): ICD-10-CM

## 2018-08-28 PROCEDURE — 99214 OFFICE O/P EST MOD 30 MIN: CPT | Performed by: PHYSICIAN ASSISTANT

## 2018-08-28 NOTE — PROGRESS NOTES
Return to office Patient Consult Note  Referred by: Damaris Soares P.A.-C.    Reason for consult: Diabetes Management Type 2    HPI:  Garry Coronel is a 62 y.o. old patient who is seeing us today for diabetes care.  This is a pleasant patient with diabetes and I appreciate the opportunity to participate in the care of this patient.    Labs of 8/8/18 HbA1c is 6.5  Labs of 5/8/18 HbA1c is 11.6, GFR 40, negative microalbumin    BG Diary:8/28/2018  In the AM:104, 116, 120, 139, 119, 123  Before Bed: 153, 128,133    Weight: on 7/3/18 she was 178 and today she is 170      1. Controlled type 2 diabetes mellitus with diabetic nephropathy, without long-term current use of insulin ([facility])    This is a new patient with me on 7/3/18  She is on:  1.  Glyburide/Metformin     STOP  1.  Glyburide/Metformin     START:  1.  Ozempic 0.5       2. Essential hypertension  Hypotention       ROS:   Constitutional: No night sweats.  Eyes:  No visual changes.  Cardiac: No chest pain, No palpitations or racing heart rate.  Resp: No shortness of breath, No cough,   Gi: No Diarrhea    All other systems were reviewed and were/are negative.  The ROS was revised/revisited during this office visit from the patients first office visit with me on 7/3/18 Please review the full ROS during the first office visit.    Past Medical History:  Patient Active Problem List    Diagnosis Date Noted   • KRANTHI (acute kidney injury) ([facility]) 08/14/2018   • Subclavian artery stenosis, right ([facility]) 08/08/2018   • Orthostatic syncope 07/25/2018   • Precordial pain 07/25/2018   • Hypotension due to drugs 07/25/2018   • Chest pain 03/10/2017   • CKD stage 3 due to type 2 diabetes mellitus ([facility]) 11/09/2015   • DM (diabetes mellitus) type II controlled with renal manifestation ([facility]) 11/09/2015   • Obesity (BMI 30-39.9) 10/09/2014   • Vitamin D deficiency 12/27/2013   • B12 deficiency 02/07/2013   • Dyslipidemia 02/07/2013   • HTN (hypertension) 01/24/2013   • Sleep apnea  "01/24/2013       Past Surgical History:  Past Surgical History:   Procedure Laterality Date   • GASTRIC BYPASS LAPAROSCOPIC  12/13/2006       Allergies:  Maxzide [hydrochlorothiazide w-triamterene]    Social History:  Social History     Social History   • Marital status:      Spouse name: N/A   • Number of children: N/A   • Years of education: N/A     Occupational History   • Not on file.     Social History Main Topics   • Smoking status: Former Smoker     Types: Cigarettes     Quit date: 1/24/1978   • Smokeless tobacco: Never Used   • Alcohol use Yes   • Drug use: No   • Sexual activity: No     Other Topics Concern   • Not on file     Social History Narrative   • No narrative on file       Family History:  Family History   Problem Relation Age of Onset   • Diabetes Mother    • Hyperlipidemia Mother    • Diabetes Brother    • Diabetes Maternal Aunt    • Heart Disease Maternal Grandfather    • Stroke Maternal Grandfather    • Cancer Paternal Grandfather        Medications:    Current Outpatient Prescriptions:   •  Semaglutide 0.25 or 0.5 MG/DOSE Solution Pen-injector, Inject 0.5 mg as instructed every 7 days., Disp: 1 PEN, Rfl: 11  •  omeprazole (PRILOSEC) 40 MG delayed-release capsule, Take 1 Cap by mouth every day., Disp: 30 Cap, Rfl: 1  •  Docosahexaenoic Acid (PRENATAL DHA) 200 MG Cap, Take  by mouth., Disp: , Rfl:   •  atorvastatin (LIPITOR) 10 MG Tab, TAKE ONE TABLET BY MOUTH ONCE DAILY, Disp: 90 Tab, Rfl: 0  •  vitamin D (CHOLECALCIFEROL) 1000 UNIT TABS, Take 2,000 Units by mouth every day., Disp: , Rfl:   •  cyanocobalamin (VITAMIN B-12) 500 MCG TABS, Take 500 mcg by mouth every 48 hours., Disp: , Rfl:   •  Blood Glucose Monitoring Suppl (ACCU-CHEK ADVANTAGE DIABETES) KIT, 1 Each by Does not apply route every day., Disp: 1 Each, Rfl: 0        Physical Examination:   Vital signs: /90   Pulse 79   Ht 1.6 m (5' 2.99\")   Wt 77.4 kg (170 lb 9.6 oz)   SpO2 98%   BMI 30.23 kg/m²   General: No " distress, cooperative, well dressed and well nourished.   Eyes: No scleral icterus or discharge, No hyposphagma  ENMT: Normal on external inspection of nose, lips, No nasal drainage   Neck: No abnormal masses on inspection  Resp: Normal effort, Bilateral clear to auscultation, No wheezing, No rales  CVS: Regular rate and rhythm, S1 S2 normal, No murmur. No gallop  Extremities: No edema bilateral extremities  Neuro: Alert and oriented  Skin: No rash, No Ulcers  Psych: Normal mood and affect      Assessment and Plan:    1. Controlled type 2 diabetes mellitus with diabetic nephropathy, without long-term current use of insulin (HCC)    START:  1.  Ozempic 0.5 once a week       2. Essential hypertension  This is stable and no changes needed    Return in about 3 months (around 11/28/2018).    Blood glucose log: Check BG in the morning when wake up, before lunch or dinner and before bed.  So three times a day.  Always bring BG diary to the next office visit.     Thank you kindly for allowing me to participate in the diabetes care plan for this patient.    Beto Street PA-C, BC-ADM  Board Certified - Advanced Diabetes Management  08/28/18    CC:   Damaris Soares P.A.-C.

## 2018-09-10 DIAGNOSIS — E78.5 DYSLIPIDEMIA: ICD-10-CM

## 2018-09-10 NOTE — TELEPHONE ENCOUNTER
From: Garry Coronel  Sent: 9/10/2018 12:05 PM PDT  Subject: Medication Renewal Request    Garry Coronel would like a refill of the following medications:     atorvastatin (LIPITOR) 10 MG Tab [Damaris Soares P.A.-C.]    Preferred pharmacy: [facility] [address] [place] [address] (), [place] - [address]    Comment:

## 2018-09-11 RX ORDER — ATORVASTATIN CALCIUM 10 MG/1
TABLET, FILM COATED ORAL
Qty: 90 TAB | Refills: 3 | Status: SHIPPED | OUTPATIENT
Start: 2018-09-11 | End: 2021-07-08 | Stop reason: SDUPTHER

## 2018-09-11 RX ORDER — ATORVASTATIN CALCIUM 10 MG/1
TABLET, FILM COATED ORAL
Qty: 30 TAB | Refills: 8 | Status: SHIPPED | OUTPATIENT
Start: 2018-09-11 | End: 2019-09-11 | Stop reason: SDUPTHER

## 2018-10-08 ENCOUNTER — HOSPITAL ENCOUNTER (OUTPATIENT)
Dept: LAB | Facility: MEDICAL CENTER | Age: 63
End: 2018-10-08
Attending: INTERNAL MEDICINE
Payer: COMMERCIAL

## 2018-10-08 DIAGNOSIS — N18.30 CKD (CHRONIC KIDNEY DISEASE) STAGE 3, GFR 30-59 ML/MIN (HCC): ICD-10-CM

## 2018-10-08 DIAGNOSIS — N17.9 AKI (ACUTE KIDNEY INJURY) (HCC): ICD-10-CM

## 2018-10-08 DIAGNOSIS — E11.8 TYPE 2 DIABETES MELLITUS WITH COMPLICATION, WITHOUT LONG-TERM CURRENT USE OF INSULIN (HCC): ICD-10-CM

## 2018-10-08 DIAGNOSIS — I10 ESSENTIAL HYPERTENSION: ICD-10-CM

## 2018-10-08 LAB
ANION GAP SERPL CALC-SCNC: 9 MMOL/L (ref 0–11.9)
BASOPHILS # BLD AUTO: 0.6 % (ref 0–1.8)
BASOPHILS # BLD: 0.05 K/UL (ref 0–0.12)
BUN SERPL-MCNC: 20 MG/DL (ref 8–22)
CALCIUM SERPL-MCNC: 10.2 MG/DL (ref 8.5–10.5)
CHLORIDE SERPL-SCNC: 103 MMOL/L (ref 96–112)
CO2 SERPL-SCNC: 28 MMOL/L (ref 20–33)
CREAT SERPL-MCNC: 0.98 MG/DL (ref 0.5–1.4)
CREAT UR-MCNC: 178.3 MG/DL
EOSINOPHIL # BLD AUTO: 0.23 K/UL (ref 0–0.51)
EOSINOPHIL NFR BLD: 2.9 % (ref 0–6.9)
ERYTHROCYTE [DISTWIDTH] IN BLOOD BY AUTOMATED COUNT: 43.8 FL (ref 35.9–50)
GLUCOSE SERPL-MCNC: 109 MG/DL (ref 65–99)
HCT VFR BLD AUTO: 45.6 % (ref 37–47)
HGB BLD-MCNC: 14.5 G/DL (ref 12–16)
IMM GRANULOCYTES # BLD AUTO: 0.02 K/UL (ref 0–0.11)
IMM GRANULOCYTES NFR BLD AUTO: 0.3 % (ref 0–0.9)
LYMPHOCYTES # BLD AUTO: 1.97 K/UL (ref 1–4.8)
LYMPHOCYTES NFR BLD: 25.2 % (ref 22–41)
MCH RBC QN AUTO: 28.7 PG (ref 27–33)
MCHC RBC AUTO-ENTMCNC: 31.8 G/DL (ref 33.6–35)
MCV RBC AUTO: 90.1 FL (ref 81.4–97.8)
MICROALBUMIN UR-MCNC: <0.7 MG/DL
MICROALBUMIN/CREAT UR: NORMAL MG/G (ref 0–30)
MONOCYTES # BLD AUTO: 0.35 K/UL (ref 0–0.85)
MONOCYTES NFR BLD AUTO: 4.5 % (ref 0–13.4)
NEUTROPHILS # BLD AUTO: 5.2 K/UL (ref 2–7.15)
NEUTROPHILS NFR BLD: 66.5 % (ref 44–72)
NRBC # BLD AUTO: 0 K/UL
NRBC BLD-RTO: 0 /100 WBC
PLATELET # BLD AUTO: 273 K/UL (ref 164–446)
PMV BLD AUTO: 10.6 FL (ref 9–12.9)
POTASSIUM SERPL-SCNC: 4 MMOL/L (ref 3.6–5.5)
RBC # BLD AUTO: 5.06 M/UL (ref 4.2–5.4)
SODIUM SERPL-SCNC: 140 MMOL/L (ref 135–145)
WBC # BLD AUTO: 7.8 K/UL (ref 4.8–10.8)

## 2018-10-08 PROCEDURE — 36415 COLL VENOUS BLD VENIPUNCTURE: CPT

## 2018-10-08 PROCEDURE — 80048 BASIC METABOLIC PNL TOTAL CA: CPT

## 2018-10-08 PROCEDURE — 82570 ASSAY OF URINE CREATININE: CPT

## 2018-10-08 PROCEDURE — 85025 COMPLETE CBC W/AUTO DIFF WBC: CPT

## 2018-10-08 PROCEDURE — 82043 UR ALBUMIN QUANTITATIVE: CPT

## 2018-10-10 ENCOUNTER — OFFICE VISIT (OUTPATIENT)
Dept: NEPHROLOGY | Facility: MEDICAL CENTER | Age: 63
End: 2018-10-10
Payer: COMMERCIAL

## 2018-10-10 VITALS
OXYGEN SATURATION: 98 % | RESPIRATION RATE: 14 BRPM | HEIGHT: 63 IN | TEMPERATURE: 97.6 F | DIASTOLIC BLOOD PRESSURE: 76 MMHG | SYSTOLIC BLOOD PRESSURE: 140 MMHG | BODY MASS INDEX: 29.41 KG/M2 | HEART RATE: 72 BPM | WEIGHT: 166 LBS

## 2018-10-10 DIAGNOSIS — N17.9 AKI (ACUTE KIDNEY INJURY) (HCC): ICD-10-CM

## 2018-10-10 DIAGNOSIS — I10 ESSENTIAL HYPERTENSION: ICD-10-CM

## 2018-10-10 DIAGNOSIS — N18.30 CKD STAGE 3 DUE TO TYPE 2 DIABETES MELLITUS (HCC): ICD-10-CM

## 2018-10-10 DIAGNOSIS — E11.22 CKD STAGE 3 DUE TO TYPE 2 DIABETES MELLITUS (HCC): ICD-10-CM

## 2018-10-10 PROCEDURE — 99214 OFFICE O/P EST MOD 30 MIN: CPT | Performed by: INTERNAL MEDICINE

## 2018-10-10 ASSESSMENT — ENCOUNTER SYMPTOMS
NAUSEA: 0
HYPERTENSION: 1
FEVER: 0
SHORTNESS OF BREATH: 0
COUGH: 0
CHILLS: 0
VOMITING: 0

## 2018-10-10 NOTE — PROGRESS NOTES
"Subjective:      Garry Coronel is a 62 y.o. female who presents with Hypertension and Chronic Kidney Disease            Patient was diagnosed with acute kidney injury secondary to hypotension induced by aggressive treatment.  She feels much better after starting antihypertensive medication.      Hypertension   This is a chronic problem. The current episode started more than 1 year ago. The problem has been gradually improving since onset. The problem is controlled. Pertinent negatives include no chest pain, malaise/fatigue, peripheral edema or shortness of breath. Risk factors for coronary artery disease include post-menopausal state and diabetes mellitus. Past treatments include nothing. The current treatment provides significant improvement. There are no compliance problems.    Chronic Kidney Disease   This is a new problem. The current episode started more than 1 year ago. The problem occurs constantly. The problem has been rapidly improving. Pertinent negatives include no chest pain, chills, coughing, fever, nausea, urinary symptoms or vomiting. Exacerbated by: hypotension.       Review of Systems   Constitutional: Negative for chills, fever and malaise/fatigue.   Respiratory: Negative for cough and shortness of breath.    Cardiovascular: Negative for chest pain and leg swelling.   Gastrointestinal: Negative for nausea and vomiting.   Genitourinary: Negative for dysuria, frequency and urgency.          Objective:     /76 (BP Location: Right arm, Patient Position: Sitting)   Pulse 72   Temp 36.4 °C (97.6 °F)   Resp 14   Ht 1.6 m (5' 3\")   Wt 75.3 kg (166 lb)   SpO2 98%   BMI 29.41 kg/m²      Physical Exam   Constitutional: She is oriented to person, place, and time. She appears well-developed and well-nourished. No distress.   HENT:   Head: Normocephalic and atraumatic.   Right Ear: External ear normal.   Left Ear: External ear normal.   Nose: Nose normal.   Eyes: Conjunctivae are normal. Right " eye exhibits no discharge. Left eye exhibits no discharge. No scleral icterus.   Cardiovascular: Normal rate and regular rhythm.    Pulmonary/Chest: Effort normal and breath sounds normal. No respiratory distress.   Musculoskeletal: She exhibits no edema.   Neurological: She is alert and oriented to person, place, and time.   Skin: Skin is warm. She is not diaphoretic.   Psychiatric: She has a normal mood and affect. Her behavior is normal.   Nursing note and vitals reviewed.              Assessment/Plan:     1. KRANTHI (acute kidney injury) ([facility])  Improved    2. CKD stage 3 due to type 2 diabetes mellitus (HCC)  Patient has no uremic symptoms  Renal dose all medication  Avoid nephrotoxins    3. Essential hypertension  Patient was advised to be on low-sodium diet  Check her blood pressure at home regularly and call us with the results to see if we need to restart some of the antihypertensive medication

## 2018-10-17 ENCOUNTER — APPOINTMENT (OUTPATIENT)
Dept: MEDICAL GROUP | Facility: MEDICAL CENTER | Age: 63
End: 2018-10-17
Payer: COMMERCIAL

## 2019-01-15 ENCOUNTER — APPOINTMENT (OUTPATIENT)
Dept: ENDOCRINOLOGY | Facility: MEDICAL CENTER | Age: 64
End: 2019-01-15
Payer: COMMERCIAL

## 2019-03-21 ENCOUNTER — HOSPITAL ENCOUNTER (EMERGENCY)
Dept: HOSPITAL 8 - ED | Age: 64
Discharge: HOME | End: 2019-03-21
Payer: COMMERCIAL

## 2019-03-21 VITALS — WEIGHT: 178.57 LBS | HEIGHT: 64 IN | BODY MASS INDEX: 30.49 KG/M2

## 2019-03-21 VITALS — SYSTOLIC BLOOD PRESSURE: 148 MMHG | DIASTOLIC BLOOD PRESSURE: 86 MMHG

## 2019-03-21 DIAGNOSIS — R55: Primary | ICD-10-CM

## 2019-03-21 DIAGNOSIS — I10: ICD-10-CM

## 2019-03-21 DIAGNOSIS — E11.9: ICD-10-CM

## 2019-03-21 DIAGNOSIS — E78.00: ICD-10-CM

## 2019-03-21 LAB
ALBUMIN SERPL-MCNC: 3.8 G/DL (ref 3.4–5)
ALP SERPL-CCNC: 118 U/L (ref 45–117)
ALT SERPL-CCNC: 21 U/L (ref 12–78)
ANION GAP SERPL CALC-SCNC: 8 MMOL/L (ref 5–15)
BASOPHILS # BLD AUTO: 0.04 X10^3/UL (ref 0–0.1)
BASOPHILS NFR BLD AUTO: 0 % (ref 0–1)
BILIRUB SERPL-MCNC: 0.5 MG/DL (ref 0.2–1)
CALCIUM SERPL-MCNC: 8.9 MG/DL (ref 8.5–10.1)
CHLORIDE SERPL-SCNC: 112 MMOL/L (ref 98–107)
CREAT SERPL-MCNC: 1.21 MG/DL (ref 0.55–1.02)
EOSINOPHIL # BLD AUTO: 0.18 X10^3/UL (ref 0–0.4)
EOSINOPHIL NFR BLD AUTO: 2 % (ref 1–7)
ERYTHROCYTE [DISTWIDTH] IN BLOOD BY AUTOMATED COUNT: 14.4 % (ref 9.6–15.2)
LYMPHOCYTES # BLD AUTO: 2.77 X10^3/UL (ref 1–3.4)
LYMPHOCYTES NFR BLD AUTO: 29 % (ref 22–44)
MCH RBC QN AUTO: 28.4 PG (ref 27–34.8)
MCHC RBC AUTO-ENTMCNC: 33 G/DL (ref 32.4–35.8)
MCV RBC AUTO: 86.2 FL (ref 80–100)
MD: NO
MONOCYTES # BLD AUTO: 0.57 X10^3/UL (ref 0.2–0.8)
MONOCYTES NFR BLD AUTO: 6 % (ref 2–9)
NEUTROPHILS # BLD AUTO: 6.06 X10^3/UL (ref 1.8–6.8)
NEUTROPHILS NFR BLD AUTO: 63 % (ref 42–75)
PLATELET # BLD AUTO: 291 X10^3/UL (ref 130–400)
PMV BLD AUTO: 8.4 FL (ref 7.4–10.4)
PROT SERPL-MCNC: 7.7 G/DL (ref 6.4–8.2)
RBC # BLD AUTO: 5.09 X10^6/UL (ref 3.82–5.3)

## 2019-03-21 PROCEDURE — 36415 COLL VENOUS BLD VENIPUNCTURE: CPT

## 2019-03-21 PROCEDURE — 93005 ELECTROCARDIOGRAM TRACING: CPT

## 2019-03-21 PROCEDURE — 96360 HYDRATION IV INFUSION INIT: CPT

## 2019-03-21 PROCEDURE — 85025 COMPLETE CBC W/AUTO DIFF WBC: CPT

## 2019-03-21 PROCEDURE — 80053 COMPREHEN METABOLIC PANEL: CPT

## 2019-03-21 PROCEDURE — 99284 EMERGENCY DEPT VISIT MOD MDM: CPT

## 2019-03-21 PROCEDURE — 71045 X-RAY EXAM CHEST 1 VIEW: CPT

## 2019-03-21 NOTE — NUR
Discharge instructions discussed with patient including when to return to 
emergency department, patient verbalizes understanding. Patient ambulates with 
steady gait to discharge desk in no acute distress.

## 2019-03-21 NOTE — NUR
Patient reports during this episode she remembers feeling "warm", light headed, 
and nauseous; remembers going to sit down and then hearing people saying her 
name. Patient reports that yesterday she had decreased sensation to the right 
side of her face (since resolved) which she felt once a number of years ago. 
Patient states she is asymptomatic at this time. Patient adds that she has had 
many stressors lately while caring for her parents. IV started, continuous 
blood pressure, SPO2 and cardiac monitoring in place. Call bell placed within 
reach with instruction for use, patient demonstrates appropriate use of call 
bell. Waiting to be evaluated by provider.

## 2019-08-16 ENCOUNTER — TELEPHONE (OUTPATIENT)
Dept: ENDOCRINOLOGY | Facility: MEDICAL CENTER | Age: 64
End: 2019-08-16

## 2019-08-16 NOTE — TELEPHONE ENCOUNTER
DOCUMENTATION OF PAR STATUS:    1. Name of Medication & Dose: OZEMPIC     2. Name of Prescription Coverage Company & phone #: [email or web address]    3. Date Prior Auth Submitted: 08/16/2019  4. What information was given to obtain insurance decision? Continuation mediation    5. Prior Auth Status?   approved    6. Patient Notified: yes

## 2019-09-03 NOTE — TELEPHONE ENCOUNTER
Was the patient seen in the last year in this department? Yes 08/28/19    Does patient have an active prescription for medications requested? No     Received Request Via: Pharmacy     OZEMPIC 0.25 or 0.5 MG/DOSE Solution Pen-injector    Sig: INJECT 0.5 MGS AS INSTRUCTED EVERY 7 DAYS

## 2019-09-06 RX ORDER — SEMAGLUTIDE 1.34 MG/ML
INJECTION, SOLUTION SUBCUTANEOUS
Qty: 1 PEN | Refills: 11 | Status: SHIPPED | OUTPATIENT
Start: 2019-09-06 | End: 2020-12-28

## 2019-09-11 ENCOUNTER — OFFICE VISIT (OUTPATIENT)
Dept: MEDICAL GROUP | Facility: MEDICAL CENTER | Age: 64
End: 2019-09-11
Payer: COMMERCIAL

## 2019-09-11 VITALS
HEART RATE: 76 BPM | DIASTOLIC BLOOD PRESSURE: 100 MMHG | OXYGEN SATURATION: 95 % | HEIGHT: 63 IN | SYSTOLIC BLOOD PRESSURE: 170 MMHG | TEMPERATURE: 97.2 F | BODY MASS INDEX: 29.23 KG/M2 | RESPIRATION RATE: 20 BRPM | WEIGHT: 165 LBS

## 2019-09-11 DIAGNOSIS — Z23 NEED FOR VACCINATION: ICD-10-CM

## 2019-09-11 DIAGNOSIS — Z12.39 SCREENING FOR BREAST CANCER: ICD-10-CM

## 2019-09-11 DIAGNOSIS — E11.21 CONTROLLED TYPE 2 DIABETES MELLITUS WITH DIABETIC NEPHROPATHY, WITHOUT LONG-TERM CURRENT USE OF INSULIN (HCC): ICD-10-CM

## 2019-09-11 DIAGNOSIS — E78.5 DYSLIPIDEMIA: ICD-10-CM

## 2019-09-11 DIAGNOSIS — I10 ESSENTIAL HYPERTENSION: ICD-10-CM

## 2019-09-11 PROBLEM — R07.9 CHEST PAIN: Status: RESOLVED | Noted: 2017-03-10 | Resolved: 2019-09-11

## 2019-09-11 LAB
HBA1C MFR BLD: 6.3 % (ref 0–5.6)
INT CON NEG: NEGATIVE
INT CON POS: POSITIVE

## 2019-09-11 PROCEDURE — 83036 HEMOGLOBIN GLYCOSYLATED A1C: CPT | Performed by: PHYSICIAN ASSISTANT

## 2019-09-11 PROCEDURE — 99214 OFFICE O/P EST MOD 30 MIN: CPT | Mod: 25 | Performed by: PHYSICIAN ASSISTANT

## 2019-09-11 PROCEDURE — 90686 IIV4 VACC NO PRSV 0.5 ML IM: CPT | Performed by: PHYSICIAN ASSISTANT

## 2019-09-11 PROCEDURE — 90471 IMMUNIZATION ADMIN: CPT | Performed by: PHYSICIAN ASSISTANT

## 2019-09-11 RX ORDER — ATORVASTATIN CALCIUM 10 MG/1
TABLET, FILM COATED ORAL
Qty: 90 TAB | Refills: 1 | Status: SHIPPED | OUTPATIENT
Start: 2019-09-11 | End: 2020-03-06

## 2019-09-11 RX ORDER — AMLODIPINE BESYLATE 5 MG/1
5 TABLET ORAL DAILY
Qty: 30 TAB | Refills: 1 | Status: SHIPPED | OUTPATIENT
Start: 2019-09-11 | End: 2019-11-11 | Stop reason: SDUPTHER

## 2019-09-11 SDOH — HEALTH STABILITY: MENTAL HEALTH: HOW MANY STANDARD DRINKS CONTAINING ALCOHOL DO YOU HAVE ON A TYPICAL DAY?: 1 OR 2

## 2019-09-11 SDOH — HEALTH STABILITY: MENTAL HEALTH: HOW OFTEN DO YOU HAVE A DRINK CONTAINING ALCOHOL?: MONTHLY OR LESS

## 2019-09-11 SDOH — HEALTH STABILITY: MENTAL HEALTH: HOW OFTEN DO YOU HAVE 6 OR MORE DRINKS ON ONE OCCASION?: NEVER

## 2019-09-11 ASSESSMENT — PATIENT HEALTH QUESTIONNAIRE - PHQ9
SUM OF ALL RESPONSES TO PHQ QUESTIONS 1-9: 8
5. POOR APPETITE OR OVEREATING: 2 - MORE THAN HALF THE DAYS
CLINICAL INTERPRETATION OF PHQ2 SCORE: 2

## 2019-09-11 NOTE — PROGRESS NOTES
"Chief Complaint   Patient presents with   • Hypertension   • Leg Pain       HPI  Garry Coronel is a 63 y.o. female here today stating that her blood pressure has recently been very elevated.  Patient has history of hypertension and was previously being treated with lisinopril hydrochlorothiazide.  Lisinopril hydrochlorothiazide was discontinued by Dr. Najjar due to episodes of hypotension and acute renal injury.  Patient was advised to follow-up with nephrology if her blood pressure starts to be elevated again.  States she has not been very good taking care of herself because she has been very stressed out, her mom passed away in June and her brothers are having problems.  She also has diabetes and metformin was discontinued due to kidney function.  She continues with ozempic, does not check her BG at home and has not followed up with Endo.  Today denies any SOB, CP, headache, change in vision or lower leg swelling  But states she has felt ringing in the ear and when she checked her BP it was elevated 170/100 , no CP no SOB.     About 10 days ago she felt her R big toe curled up and cramping, after that she continues to have soreness over the toes, and the anterior sheen.   Her R knee has been hurting a lot lately.    Past medical, surgical, family, and social history is reviewed in Epic chart by me today.   Medications and allergies reviewed and updated in Epic chart by me today.     ROS:   As documented in history of present illness above    Exam:  BP (!) 170/100 (BP Location: Right arm, Patient Position: Sitting, BP Cuff Size: Adult long)   Pulse 76   Temp 36.2 °C (97.2 °F) (Temporal)   Resp 20   Ht 1.6 m (5' 3\")   Wt 74.8 kg (165 lb)   SpO2 95%   Constitutional: Alert, no distress, plus 3 vital signs  Skin:  Warm, dry, no rashes invisible areas  Eye: Equal, round and reactive, conjunctiva clear  Respiratory: Unlabored respiratory effort, lungs clear to auscultation, no wheezes, no " rhonchi  Cardiovascular: RRR, no murmur, no lower extremities edema,   MS: Right leg without any swelling, no tenderness to palpation over big toe, mild pain over anterior foot with plantar flexion of right big toe   Psych: Alert, pleasant, well-groomed, normal affect    A/P:    1. Controlled type 2 diabetes mellitus with diabetic nephropathy, without long-term current use of insulin ([facility])  Controlled   - REFERRAL TO OPTOMETRY  - POCT  A1C --> 6.3    2. Essential hypertension  we will start patient on low-dose amlodipine but avoid episodes of hypotension.  Also amlodipine should not affect her kidney function.  Advised patient to follow-up with nephrologist.  Patient is to follow-up next week for us to recheck her BP  - amLODIPine (NORVASC) 5 MG Tab; Take 1 Tab by mouth every day.  Dispense: 30 Tab; Refill: 1    3. Screening for breast cancer    - MA-SCREEN MAMMO W/CAD-BILAT; Future    4. Need for vaccination    - Influenza Vaccine Quad Injection >3Y (PF)    5. Dyslipidemia  - atorvastatin (LIPITOR) 10 MG Tab; TAKE ONE TABLET BY MOUTH ONCE DAILY  Dispense: 90 Tab; Refill: 1    F/u in 1 wks

## 2019-09-17 ENCOUNTER — OFFICE VISIT (OUTPATIENT)
Dept: MEDICAL GROUP | Facility: MEDICAL CENTER | Age: 64
End: 2019-09-17
Payer: COMMERCIAL

## 2019-09-17 VITALS
OXYGEN SATURATION: 95 % | RESPIRATION RATE: 14 BRPM | DIASTOLIC BLOOD PRESSURE: 82 MMHG | HEART RATE: 74 BPM | WEIGHT: 176 LBS | HEIGHT: 64 IN | SYSTOLIC BLOOD PRESSURE: 122 MMHG | TEMPERATURE: 97.5 F | BODY MASS INDEX: 30.05 KG/M2

## 2019-09-17 DIAGNOSIS — I10 ESSENTIAL HYPERTENSION: ICD-10-CM

## 2019-09-17 PROCEDURE — 99214 OFFICE O/P EST MOD 30 MIN: CPT | Performed by: PHYSICIAN ASSISTANT

## 2019-09-17 NOTE — PROGRESS NOTES
"Chief Complaint   Patient presents with   • Hypertension     1 week follow up pt brought bp levels        HPI  Garry Coronel is a 63 y.o. female here today for f/u on HTN.  Patient used to hypertension and was on lisinopril hydrochlorothiazide which was discontinued due to decreased kidney function as well as hypotensive episode.  Gradually her blood pressure has increased and last time in office her BP was 170/100.  Patient reported episodes of headaches and when she would check her BP at home her BP would be elevated as well.  Last time we started patient on amlodipine 5 mg daily which she has continued without any side effects.  States all headaches have resolved.  Today in office BP is very well controlled 122/82.  Patient has checked her BP at home with  blood pressure averaging around 126/90.  Patient has a follow-up appointment with Dr. Najjar on September 25 going to discuss all this further with him.  No S OB no CP, no lower leg edema no lightheadedness      Past medical, surgical, family, and social history is reviewed in Epic chart by me today.   Medications and allergies reviewed and updated in Epic chart by me today.     ROS:   As documented in history of present illness above    Exam:  /82 (BP Location: Right arm, Patient Position: Sitting, BP Cuff Size: Adult)   Pulse 74   Temp 36.4 °C (97.5 °F) (Temporal)   Resp 14   Ht 1.626 m (5' 4\")   Wt 79.8 kg (176 lb)   SpO2 95%   Constitutional: Alert, no distress, plus 3 vital signs  Skin:  Warm, dry, no rashes invisible areas    Cardiovascular: RRR, no murmur, no leg swelling     Psych: Alert, pleasant, well-groomed, normal affect    A/P:  1. BMI 30.0-30.9,adult  Discussed trying to lose about 10 pounds and go back to her previous weight at 165 pounds  - Patient identified as having weight management issue.  Appropriate orders and counseling given.    2.  Hypertension  Continue amlodipine 5 mg daily for now, BP is controlled today in our " office 122/82.  Patient states her headaches have resolved.  Patient is going to follow-up with Dr. Najjar on 25th.    F/u prn

## 2019-09-19 ENCOUNTER — HOSPITAL ENCOUNTER (OUTPATIENT)
Dept: LAB | Facility: MEDICAL CENTER | Age: 64
End: 2019-09-19
Attending: INTERNAL MEDICINE
Payer: COMMERCIAL

## 2019-09-19 DIAGNOSIS — I10 ESSENTIAL HYPERTENSION: ICD-10-CM

## 2019-09-19 DIAGNOSIS — E11.22 CKD STAGE 3 DUE TO TYPE 2 DIABETES MELLITUS (HCC): ICD-10-CM

## 2019-09-19 DIAGNOSIS — N18.30 CKD STAGE 3 DUE TO TYPE 2 DIABETES MELLITUS (HCC): ICD-10-CM

## 2019-09-19 DIAGNOSIS — N17.9 AKI (ACUTE KIDNEY INJURY) (HCC): ICD-10-CM

## 2019-09-19 LAB
ANION GAP SERPL CALC-SCNC: 5 MMOL/L (ref 0–11.9)
BASOPHILS # BLD AUTO: 0.7 % (ref 0–1.8)
BASOPHILS # BLD: 0.05 K/UL (ref 0–0.12)
BUN SERPL-MCNC: 24 MG/DL (ref 8–22)
CALCIUM SERPL-MCNC: 9.5 MG/DL (ref 8.5–10.5)
CHLORIDE SERPL-SCNC: 104 MMOL/L (ref 96–112)
CO2 SERPL-SCNC: 31 MMOL/L (ref 20–33)
CREAT SERPL-MCNC: 0.94 MG/DL (ref 0.5–1.4)
CREAT UR-MCNC: 146.2 MG/DL
EOSINOPHIL # BLD AUTO: 0.3 K/UL (ref 0–0.51)
EOSINOPHIL NFR BLD: 4.2 % (ref 0–6.9)
ERYTHROCYTE [DISTWIDTH] IN BLOOD BY AUTOMATED COUNT: 46.8 FL (ref 35.9–50)
GLUCOSE SERPL-MCNC: 133 MG/DL (ref 65–99)
HCT VFR BLD AUTO: 44.7 % (ref 37–47)
HGB BLD-MCNC: 14.1 G/DL (ref 12–16)
IMM GRANULOCYTES # BLD AUTO: 0.02 K/UL (ref 0–0.11)
IMM GRANULOCYTES NFR BLD AUTO: 0.3 % (ref 0–0.9)
LYMPHOCYTES # BLD AUTO: 1.68 K/UL (ref 1–4.8)
LYMPHOCYTES NFR BLD: 23.6 % (ref 22–41)
MCH RBC QN AUTO: 29.2 PG (ref 27–33)
MCHC RBC AUTO-ENTMCNC: 31.5 G/DL (ref 33.6–35)
MCV RBC AUTO: 92.5 FL (ref 81.4–97.8)
MICROALBUMIN UR-MCNC: 0.9 MG/DL
MICROALBUMIN/CREAT UR: 6 MG/G (ref 0–30)
MONOCYTES # BLD AUTO: 0.38 K/UL (ref 0–0.85)
MONOCYTES NFR BLD AUTO: 5.3 % (ref 0–13.4)
NEUTROPHILS # BLD AUTO: 4.69 K/UL (ref 2–7.15)
NEUTROPHILS NFR BLD: 65.9 % (ref 44–72)
NRBC # BLD AUTO: 0 K/UL
NRBC BLD-RTO: 0 /100 WBC
PLATELET # BLD AUTO: 248 K/UL (ref 164–446)
PMV BLD AUTO: 10.4 FL (ref 9–12.9)
POTASSIUM SERPL-SCNC: 3.9 MMOL/L (ref 3.6–5.5)
RBC # BLD AUTO: 4.83 M/UL (ref 4.2–5.4)
SODIUM SERPL-SCNC: 140 MMOL/L (ref 135–145)
WBC # BLD AUTO: 7.1 K/UL (ref 4.8–10.8)

## 2019-09-19 PROCEDURE — 80048 BASIC METABOLIC PNL TOTAL CA: CPT

## 2019-09-19 PROCEDURE — 85025 COMPLETE CBC W/AUTO DIFF WBC: CPT

## 2019-09-19 PROCEDURE — 82570 ASSAY OF URINE CREATININE: CPT

## 2019-09-19 PROCEDURE — 36415 COLL VENOUS BLD VENIPUNCTURE: CPT

## 2019-09-19 PROCEDURE — 82043 UR ALBUMIN QUANTITATIVE: CPT

## 2019-09-25 ENCOUNTER — OFFICE VISIT (OUTPATIENT)
Dept: NEPHROLOGY | Facility: MEDICAL CENTER | Age: 64
End: 2019-09-25
Payer: COMMERCIAL

## 2019-09-25 VITALS
DIASTOLIC BLOOD PRESSURE: 76 MMHG | HEIGHT: 63 IN | SYSTOLIC BLOOD PRESSURE: 134 MMHG | BODY MASS INDEX: 31.71 KG/M2 | HEART RATE: 69 BPM | TEMPERATURE: 97.7 F | WEIGHT: 179 LBS | RESPIRATION RATE: 14 BRPM | OXYGEN SATURATION: 97 %

## 2019-09-25 DIAGNOSIS — I10 ESSENTIAL HYPERTENSION: ICD-10-CM

## 2019-09-25 PROCEDURE — 99214 OFFICE O/P EST MOD 30 MIN: CPT | Performed by: INTERNAL MEDICINE

## 2019-09-25 ASSESSMENT — ENCOUNTER SYMPTOMS
HYPERTENSION: 1
NAUSEA: 0
SHORTNESS OF BREATH: 0
COUGH: 0
FEVER: 0
CHILLS: 0
VOMITING: 0

## 2019-09-25 NOTE — PROGRESS NOTES
"Subjective:      Garry Coronel is a 63 y.o. female who presents with Chronic Kidney Disease and Hypertension            Patient has a history of acute kidney injury secondary to overmedication with antihypertension medication.  Creatinine has improved  Is doing well overall.    Chronic Kidney Disease   This is a chronic problem. The current episode started more than 1 month ago. The problem occurs constantly. The problem has been gradually improving. Pertinent negatives include no chest pain, chills, coughing, fever, nausea, urinary symptoms or vomiting. Nothing aggravates the symptoms.   Hypertension   This is a chronic problem. The current episode started more than 1 year ago. The problem has been waxing and waning since onset. The problem is controlled. Pertinent negatives include no chest pain, malaise/fatigue, peripheral edema or shortness of breath. Risk factors for coronary artery disease include post-menopausal state and diabetes mellitus. Past treatments include calcium channel blockers. The current treatment provides significant improvement. There are no compliance problems.  Hypertensive end-organ damage includes kidney disease. Identifiable causes of hypertension include chronic renal disease.       Review of Systems   Constitutional: Negative for chills, fever and malaise/fatigue.   Respiratory: Negative for cough and shortness of breath.    Cardiovascular: Negative for chest pain and leg swelling.   Gastrointestinal: Negative for nausea and vomiting.   Genitourinary: Negative for dysuria, frequency and urgency.          Objective:     /76 (BP Location: Right arm, Patient Position: Sitting, BP Cuff Size: Adult)   Pulse 69   Temp 36.5 °C (97.7 °F) (Temporal)   Resp 14   Ht 1.6 m (5' 3\")   Wt 81.2 kg (179 lb)   LMP 07/09/2006   SpO2 97%   BMI 31.71 kg/m²      Physical Exam   Constitutional: She is oriented to person, place, and time. She appears well-developed and well-nourished. No " distress.   HENT:   Head: Normocephalic and atraumatic.   Right Ear: External ear normal.   Left Ear: External ear normal.   Nose: Nose normal.   Eyes: Conjunctivae are normal. Right eye exhibits no discharge. Left eye exhibits no discharge. No scleral icterus.   Cardiovascular: Normal rate and regular rhythm.   Pulmonary/Chest: Effort normal and breath sounds normal. No respiratory distress.   Musculoskeletal: She exhibits no edema.   Neurological: She is alert and oriented to person, place, and time.   Skin: Skin is warm. She is not diaphoretic.   Psychiatric: She has a normal mood and affect. Her behavior is normal.   Nursing note and vitals reviewed.    Past Medical History:   Diagnosis Date   • Hypertension    • Sleep apnea      Social History     Socioeconomic History   • Marital status:      Spouse name: Not on file   • Number of children: Not on file   • Years of education: Not on file   • Highest education level: Not on file   Occupational History   • Not on file   Social Needs   • Financial resource strain: Not on file   • Food insecurity:     Worry: Not on file     Inability: Not on file   • Transportation needs:     Medical: Not on file     Non-medical: Not on file   Tobacco Use   • Smoking status: Former Smoker     Types: Cigarettes     Last attempt to quit: 1978     Years since quittin.6   • Smokeless tobacco: Never Used   Substance and Sexual Activity   • Alcohol use: Yes     Frequency: Monthly or less     Drinks per session: 1 or 2     Binge frequency: Never   • Drug use: No   • Sexual activity: Never   Lifestyle   • Physical activity:     Days per week: Not on file     Minutes per session: Not on file   • Stress: Not on file   Relationships   • Social connections:     Talks on phone: Not on file     Gets together: Not on file     Attends [employer] service: Not on file     Active member of club or organization: Not on file     Attends meetings of clubs or organizations: Not on file      Relationship status: Not on file   • Intimate partner violence:     Fear of current or ex partner: Not on file     Emotionally abused: Not on file     Physically abused: Not on file     Forced sexual activity: Not on file   Other Topics Concern   • Not on file   Social History Narrative   • Not on file     Family History   Problem Relation Age of Onset   • Diabetes Mother    • Hyperlipidemia Mother    • Diabetes Brother    • Diabetes Maternal Aunt    • Heart Disease Maternal Grandfather    • Stroke Maternal Grandfather    • Cancer Paternal Grandfather      Recent Labs     10/08/18  1213 09/19/19  0836   SODIUM 140 140   POTASSIUM 4.0 3.9   CHLORIDE 103 104   CO2 28 31   BUN 20 24*   CREATININE 0.98 0.94               Assessment/Plan:     1. Essential hypertension  Better controlled  Agree with adding amlodipine   continue same medication regimen  Continue low-sodium diet    2 CKD  Creatinine has improved  No uremic symptoms  Renal dose of medication  Avoid nephrotoxins  Continue same medication regimen

## 2019-10-16 ENCOUNTER — OFFICE VISIT (OUTPATIENT)
Dept: MEDICAL GROUP | Facility: MEDICAL CENTER | Age: 64
End: 2019-10-16
Payer: COMMERCIAL

## 2019-10-16 VITALS
HEART RATE: 72 BPM | HEIGHT: 63 IN | WEIGHT: 180.56 LBS | BODY MASS INDEX: 31.99 KG/M2 | TEMPERATURE: 97.8 F | OXYGEN SATURATION: 93 % | SYSTOLIC BLOOD PRESSURE: 138 MMHG | RESPIRATION RATE: 18 BRPM | DIASTOLIC BLOOD PRESSURE: 82 MMHG

## 2019-10-16 DIAGNOSIS — Z23 NEED FOR VACCINATION: ICD-10-CM

## 2019-10-16 DIAGNOSIS — F41.9 ANXIETY: ICD-10-CM

## 2019-10-16 DIAGNOSIS — F33.0 MILD EPISODE OF RECURRENT MAJOR DEPRESSIVE DISORDER (HCC): ICD-10-CM

## 2019-10-16 PROCEDURE — 99214 OFFICE O/P EST MOD 30 MIN: CPT | Mod: 25 | Performed by: PHYSICIAN ASSISTANT

## 2019-10-16 PROCEDURE — 90732 PPSV23 VACC 2 YRS+ SUBQ/IM: CPT | Performed by: PHYSICIAN ASSISTANT

## 2019-10-16 PROCEDURE — 90471 IMMUNIZATION ADMIN: CPT | Performed by: PHYSICIAN ASSISTANT

## 2019-10-16 RX ORDER — ESCITALOPRAM OXALATE 10 MG/1
10 TABLET ORAL DAILY
Qty: 30 TAB | Refills: 1 | Status: SHIPPED | OUTPATIENT
Start: 2019-10-16 | End: 2019-11-20 | Stop reason: SDUPTHER

## 2019-10-16 RX ORDER — HYDROXYZINE HYDROCHLORIDE 10 MG/1
10 TABLET, FILM COATED ORAL 3 TIMES DAILY PRN
Qty: 30 TAB | Refills: 0 | Status: SHIPPED | OUTPATIENT
Start: 2019-10-16

## 2019-10-16 RX ORDER — ALPRAZOLAM 0.25 MG/1
0.25 TABLET ORAL NIGHTLY PRN
Qty: 15 TAB | Refills: 0 | Status: SHIPPED
Start: 2019-10-16 | End: 2019-11-20 | Stop reason: SDUPTHER

## 2019-10-16 NOTE — PROGRESS NOTES
"Chief Complaint   Patient presents with   • Anxiety     discuss xanax med       HPI  Garry Coronel is a 63 y.o. female here today to discuss anxiety which is a new problem to me to discuss.    Patient states her mom was in the past away and currently her dad is in hospice.  Patient has been having worrying every day and sometimes her anxiety gets bad.  Also patient has lack of interest and depressed mood, cries sometimes.  States her focus has decreased and sometimes she feels like she does not have any energy to get stuff done however she forces herself to do tasks around the house.  She has adopted her grandkids who is about 17 years old.  He also is another source of stress in her life.  He is a struggling to finish high school and that worries her.  Patient has trouble sleeping at night.  Appetite intact.  No suicidal or homicidal ideations.  Patient admits to having excessive worrying every day.  No panic attacks.  No manic episodes.    Past medical, surgical, family, and social history is reviewed in Epic chart by me today.   Medications and allergies reviewed and updated in Epic chart by me today.     ROS:   As documented in history of present illness above    Exam:  /82 (BP Location: Right arm, Patient Position: Sitting, BP Cuff Size: Adult)   Pulse 72   Temp 36.6 °C (97.8 °F) (Temporal)   Resp 18   Ht 1.6 m (5' 3\")   Wt 81.9 kg (180 lb 8.9 oz)   SpO2 93%   Constitutional: Alert, no distress, plus 3 vital signs  Skin:  Warm, dry, no rashes invisible areas  sych: Alert, pleasant, well-groomed, depressed affect, cried in office    A/P:  1. Need for vaccination    - Pneumoccocal Polysaccharide Vaccine 23-Valent =>1yo SQ/IM    2. Anxiety  Advised patient not to mix Xanax and alcohol, not to drive on it.  - ALPRAZolam (XANAX) 0.25 MG Tab; Take 1 Tab by mouth at bedtime as needed for Sleep or Anxiety for up to 30 days.  Dispense: 15 Tab; Refill: 0  - hydrOXYzine HCl (ATARAX) 10 MG Tab; Take 1 Tab by " mouth 3 times a day as needed for Anxiety.  Dispense: 30 Tab; Refill: 0    3. Mild episode of recurrent major depressive disorder (HCC)    - escitalopram (LEXAPRO) 10 MG Tab; Take 1 Tab by mouth every day.  Dispense: 30 Tab; Refill: 1    F/u in 4-6 wks

## 2019-11-11 DIAGNOSIS — I10 ESSENTIAL HYPERTENSION: ICD-10-CM

## 2019-11-12 RX ORDER — AMLODIPINE BESYLATE 5 MG/1
5 TABLET ORAL DAILY
Qty: 30 TAB | Refills: 1 | Status: SHIPPED | OUTPATIENT
Start: 2019-11-12 | End: 2020-01-08

## 2019-11-16 ENCOUNTER — HOSPITAL ENCOUNTER (OUTPATIENT)
Dept: RADIOLOGY | Facility: MEDICAL CENTER | Age: 64
End: 2019-11-16
Attending: PHYSICIAN ASSISTANT
Payer: COMMERCIAL

## 2019-11-16 DIAGNOSIS — Z12.39 SCREENING FOR BREAST CANCER: ICD-10-CM

## 2019-11-16 PROCEDURE — 77067 SCR MAMMO BI INCL CAD: CPT

## 2019-11-20 ENCOUNTER — OFFICE VISIT (OUTPATIENT)
Dept: MEDICAL GROUP | Facility: MEDICAL CENTER | Age: 64
End: 2019-11-20
Payer: COMMERCIAL

## 2019-11-20 VITALS
RESPIRATION RATE: 14 BRPM | SYSTOLIC BLOOD PRESSURE: 102 MMHG | BODY MASS INDEX: 31.77 KG/M2 | HEIGHT: 64 IN | DIASTOLIC BLOOD PRESSURE: 76 MMHG | HEART RATE: 70 BPM | OXYGEN SATURATION: 91 % | TEMPERATURE: 98.2 F | WEIGHT: 186.07 LBS

## 2019-11-20 DIAGNOSIS — G47.9 SLEEP DISORDER: ICD-10-CM

## 2019-11-20 DIAGNOSIS — F33.0 MILD EPISODE OF RECURRENT MAJOR DEPRESSIVE DISORDER (HCC): ICD-10-CM

## 2019-11-20 DIAGNOSIS — F41.9 ANXIETY: ICD-10-CM

## 2019-11-20 PROCEDURE — 99214 OFFICE O/P EST MOD 30 MIN: CPT | Performed by: PHYSICIAN ASSISTANT

## 2019-11-20 RX ORDER — ESCITALOPRAM OXALATE 10 MG/1
10 TABLET ORAL DAILY
Qty: 90 TAB | Refills: 1 | Status: SHIPPED | OUTPATIENT
Start: 2019-11-20 | End: 2020-06-02

## 2019-11-20 RX ORDER — ALPRAZOLAM 0.25 MG/1
0.25 TABLET ORAL NIGHTLY PRN
Qty: 15 TAB | Refills: 0 | Status: SHIPPED | OUTPATIENT
Start: 2019-11-20 | End: 2019-12-20

## 2019-11-20 NOTE — PROGRESS NOTES
"Chief Complaint   Patient presents with   • Follow-Up     on meds given last time       HPI  Garry Coronel is a 63 y.o. female here today for follow-up on depression anxiety and sleep problems.  Patient was a started on Lexapro 10 mg daily, without any side effects.  States medicine has helped improve her mood, irritability is better.  Patient is able to handle stress better.  Anxiety has improved as well.  Patient's father is a still in hospice however patient is able to better emotionally handle the problems.  Has tried Atarax 10 mg and Xanax 0.25 mg as needed for sleep and anxiety.  States Xanax has been more helpful with less side effects.  She wakes up feeling better in the morning with Xanax.  With hydroxyzine 10 mg next day sometimes she feels drowsy.      Past medical, surgical, family, and social history is reviewed in Epic chart by me today.   Medications and allergies reviewed and updated in Epic chart by me today.     ROS:   As documented in history of present illness above    Exam:  /76 (BP Location: Right arm, Patient Position: Sitting, BP Cuff Size: Adult long)   Pulse 70   Temp 36.8 °C (98.2 °F) (Temporal)   Resp 14   Ht 1.626 m (5' 4\")   Wt 84.4 kg (186 lb 1.1 oz)   SpO2 91%   Constitutional: Alert, no distress, plus 3 vital signs  Skin:  Warm, dry, no rashes invisible areas  Eye: Equal, round and reactive, conjunctiva clear  Psych: Alert, pleasant, well-groomed, normal affect    A/P:        1. Mild episode of recurrent major depressive disorder (HCC)    - escitalopram (LEXAPRO) 10 MG Tab; Take 1 Tab by mouth every day.  Dispense: 90 Tab; Refill: 1    2. Anxiety    - ALPRAZolam (XANAX) 0.25 MG Tab; Take 1 Tab by mouth at bedtime as needed for Sleep or Anxiety for up to 30 days.  Dispense: 15 Tab; Refill: 0    3. Sleep disorder  - ALPRAZolam (XANAX) 0.25 MG Tab; Take 1 Tab by mouth at bedtime as needed for Sleep or Anxiety for up to 30 days.  Dispense: 15 Tab; Refill: 0    F/u in 3 " months

## 2020-01-08 DIAGNOSIS — I10 ESSENTIAL HYPERTENSION: ICD-10-CM

## 2020-01-08 RX ORDER — AMLODIPINE BESYLATE 5 MG/1
5 TABLET ORAL DAILY
Qty: 90 TAB | Refills: 1 | Status: SHIPPED | OUTPATIENT
Start: 2020-01-08 | End: 2020-08-25

## 2020-01-10 ENCOUNTER — OFFICE VISIT (OUTPATIENT)
Dept: MEDICAL GROUP | Facility: MEDICAL CENTER | Age: 65
End: 2020-01-10
Payer: MEDICARE

## 2020-01-10 VITALS
BODY MASS INDEX: 33.75 KG/M2 | DIASTOLIC BLOOD PRESSURE: 84 MMHG | SYSTOLIC BLOOD PRESSURE: 130 MMHG | TEMPERATURE: 97.2 F | HEART RATE: 72 BPM | HEIGHT: 63 IN | WEIGHT: 190.48 LBS | OXYGEN SATURATION: 98 %

## 2020-01-10 DIAGNOSIS — F41.9 ANXIETY: ICD-10-CM

## 2020-01-10 DIAGNOSIS — F32.A DEPRESSION, UNSPECIFIED DEPRESSION TYPE: ICD-10-CM

## 2020-01-10 PROCEDURE — 99213 OFFICE O/P EST LOW 20 MIN: CPT | Performed by: PHYSICIAN ASSISTANT

## 2020-01-10 NOTE — PROGRESS NOTES
"Chief Complaint   Patient presents with   • Paperwork     Requesting a letter to [place] [employer] stating that her dogs are for emotional support        HPI  Garry Coronel is a 64 y.o. female here today for emotional support animal letter.  Patient has history of anxiety and depression.  Recently lost her mom.  Patient had 3 dogs and now has her mom 3 dogs for total of 6 dogs.  Per patient [place] allows for max 3 dogs or 7 cats.  States she is emotionally attached to all 6 dogs and would like a letter for emotional support animal.  Patient continues on Lexapro 10 mg daily.        Past medical, surgical, family, and social history is reviewed in Epic chart by me today.   Medications and allergies reviewed and updated in Epic chart by me today.     ROS:   As documented in history of present illness above    Exam:  /84 (BP Location: Right arm, Patient Position: Sitting, BP Cuff Size: Adult long)   Pulse 72   Temp 36.2 °C (97.2 °F) (Temporal)   Ht 1.6 m (5' 3\")   Wt 86.4 kg (190 lb 7.6 oz)   SpO2 98%   Constitutional: Alert, no distress, plus 3 vital signs  Skin:  Warm, dry, no rashes invisible areas  Eye: Equal, round and reactive, conjunctiva clear  Psych: Alert, pleasant, well-groomed, normal affect    A/P:  1. Anxiety  2. Depression, unspecified depression type      Explained to patient that I cannot write a letter saying patient needs all 6 dogs for emotional support animal, however I wrote a letter saying patient has depression and anxiety and could benefit from emotional support animals.  Is going to be up to the city and human society to decide if they can allow her to keep 6 dogs or not.    F/u prn     "

## 2020-01-10 NOTE — LETTER
January 10, 2020         Patient: Garry Coronel   YOB: 1955   Date of Visit: 1/10/2020           To Whom it May Concern:    Garry Coronel was seen in my clinic on 1/10/2020. She has depression and anxiety and could benefit from emotional support animal.    If you have any questions or concerns, please don't hesitate to call.        Sincerely,           Damaris Soares P.A.-C.  Electronically Signed

## 2020-02-14 DIAGNOSIS — F41.9 ANXIETY: ICD-10-CM

## 2020-02-18 RX ORDER — ALPRAZOLAM 0.25 MG/1
TABLET ORAL
Qty: 15 TAB | Refills: 1 | Status: SHIPPED | OUTPATIENT
Start: 2020-02-18 | End: 2020-03-19

## 2020-03-06 DIAGNOSIS — E78.5 DYSLIPIDEMIA: ICD-10-CM

## 2020-03-06 RX ORDER — ATORVASTATIN CALCIUM 10 MG/1
TABLET, FILM COATED ORAL
Qty: 90 TAB | Refills: 0 | Status: SHIPPED | OUTPATIENT
Start: 2020-03-06 | End: 2020-06-02

## 2020-05-31 DIAGNOSIS — F33.0 MILD EPISODE OF RECURRENT MAJOR DEPRESSIVE DISORDER (HCC): ICD-10-CM

## 2020-05-31 DIAGNOSIS — E78.5 DYSLIPIDEMIA: ICD-10-CM

## 2020-06-02 RX ORDER — ATORVASTATIN CALCIUM 10 MG/1
TABLET, FILM COATED ORAL
Qty: 90 TAB | Refills: 0 | Status: SHIPPED | OUTPATIENT
Start: 2020-06-02 | End: 2020-09-14 | Stop reason: SDUPTHER

## 2020-06-02 RX ORDER — ESCITALOPRAM OXALATE 10 MG/1
TABLET ORAL
Qty: 90 TAB | Refills: 0 | Status: SHIPPED | OUTPATIENT
Start: 2020-06-02 | End: 2020-09-14 | Stop reason: SDUPTHER

## 2020-08-11 ENCOUNTER — PATIENT MESSAGE (OUTPATIENT)
Dept: MEDICAL GROUP | Facility: MEDICAL CENTER | Age: 65
End: 2020-08-11

## 2020-08-11 ENCOUNTER — TELEPHONE (OUTPATIENT)
Dept: ENDOCRINOLOGY | Facility: MEDICAL CENTER | Age: 65
End: 2020-08-11

## 2020-08-11 DIAGNOSIS — I10 ESSENTIAL HYPERTENSION: ICD-10-CM

## 2020-08-11 DIAGNOSIS — Z00.00 PREVENTATIVE HEALTH CARE: ICD-10-CM

## 2020-08-11 DIAGNOSIS — E11.22 CKD STAGE 3 DUE TO TYPE 2 DIABETES MELLITUS (HCC): ICD-10-CM

## 2020-08-11 DIAGNOSIS — E11.21 CONTROLLED TYPE 2 DIABETES MELLITUS WITH DIABETIC NEPHROPATHY, WITHOUT LONG-TERM CURRENT USE OF INSULIN (HCC): ICD-10-CM

## 2020-08-11 DIAGNOSIS — N18.30 CKD STAGE 3 DUE TO TYPE 2 DIABETES MELLITUS (HCC): ICD-10-CM

## 2020-08-25 DIAGNOSIS — I10 ESSENTIAL HYPERTENSION: ICD-10-CM

## 2020-08-25 RX ORDER — AMLODIPINE BESYLATE 5 MG/1
TABLET ORAL
Qty: 90 TAB | Refills: 3 | Status: SHIPPED | OUTPATIENT
Start: 2020-08-25 | End: 2021-03-25 | Stop reason: SDUPTHER

## 2020-09-10 NOTE — TELEPHONE ENCOUNTER
Final Anesthesia Post-op Assessment    Patient: Rene Robles  Procedure(s) Performed: EGDCOLONOSCOPY  Anesthesia type: General    Vitals Value Taken Time   Temp 36.4 °C (97.5 °F) 09/10/20 1100   Pulse 101 09/10/20 1100   Resp 30 09/10/20 1100   SpO2 94 % 09/10/20 1100   BP 99/49 09/10/20 1100         Patient Location: PACU Phase 1  Post-op Vital Signs:stable  Level of Consciousness: awake and alert  Respiratory Status: spontaneous ventilation  Cardiovascular stable  Hydration: euvolemic  Pain Management: adequately controlled  Handoff: Handoff to receiving nurse was performed and questions were answered Nausea: None  Airway Patency:patent  Post-op Assessment: no complications and patient tolerated procedure well with no complications      No complications documented.    error

## 2020-09-14 DIAGNOSIS — E78.5 DYSLIPIDEMIA: ICD-10-CM

## 2020-09-14 DIAGNOSIS — F33.0 MILD EPISODE OF RECURRENT MAJOR DEPRESSIVE DISORDER (HCC): ICD-10-CM

## 2020-09-15 RX ORDER — ESCITALOPRAM OXALATE 10 MG/1
10 TABLET ORAL DAILY
Qty: 90 TAB | Refills: 0 | Status: SHIPPED | OUTPATIENT
Start: 2020-09-15

## 2020-09-15 RX ORDER — ATORVASTATIN CALCIUM 10 MG/1
10 TABLET, FILM COATED ORAL DAILY
Qty: 90 TAB | Refills: 0 | Status: SHIPPED | OUTPATIENT
Start: 2020-09-15

## 2020-09-15 RX ORDER — ESCITALOPRAM OXALATE 10 MG/1
TABLET ORAL
Qty: 90 TAB | Refills: 0 | Status: SHIPPED | OUTPATIENT
Start: 2020-09-15 | End: 2021-03-25 | Stop reason: SDUPTHER

## 2020-09-15 RX ORDER — ATORVASTATIN CALCIUM 10 MG/1
TABLET, FILM COATED ORAL
Qty: 90 TAB | Refills: 3 | Status: SHIPPED | OUTPATIENT
Start: 2020-09-15

## 2020-10-27 ENCOUNTER — PATIENT MESSAGE (OUTPATIENT)
Dept: MEDICAL GROUP | Facility: MEDICAL CENTER | Age: 65
End: 2020-10-27

## 2020-10-27 DIAGNOSIS — E11.21 CONTROLLED TYPE 2 DIABETES MELLITUS WITH DIABETIC NEPHROPATHY, WITHOUT LONG-TERM CURRENT USE OF INSULIN (HCC): ICD-10-CM

## 2020-10-27 NOTE — TELEPHONE ENCOUNTER
From: Garry Coronel  To: Damaris Soares P.A.-C.  Sent: 10/27/2020 10:54 AM PDT  Subject: Non-Urgent Medical Question    I have an appointment with Dr Arrington on Nov 2 nd but have to cancel as I will be in [place]   this Friday til Nov 10 th I used my last ozempic injectable today. I left a message at [facility] today to get a refil thru you. I also left a message about rescheduling with the Dr yesterday but it said it can take up to 72 hrs to hear back. Could you approve two refills to help until I get rescheduled   Garry Coronel 1955

## 2020-10-28 RX ORDER — SEMAGLUTIDE 1.34 MG/ML
INJECTION, SOLUTION SUBCUTANEOUS
Qty: 6 ML | Refills: 0 | Status: SHIPPED | OUTPATIENT
Start: 2020-10-28 | End: 2020-12-28

## 2020-12-08 DIAGNOSIS — L98.9 SKIN LESION: ICD-10-CM

## 2020-12-08 DIAGNOSIS — R10.13 EPIGASTRIC PAIN: ICD-10-CM

## 2020-12-08 DIAGNOSIS — M81.0 OSTEOPOROSIS, UNSPECIFIED OSTEOPOROSIS TYPE, UNSPECIFIED PATHOLOGICAL FRACTURE PRESENCE: ICD-10-CM

## 2020-12-18 ENCOUNTER — TELEPHONE (OUTPATIENT)
Dept: ENDOCRINOLOGY | Facility: MEDICAL CENTER | Age: 65
End: 2020-12-18

## 2020-12-18 DIAGNOSIS — E11.21 CONTROLLED TYPE 2 DIABETES MELLITUS WITH DIABETIC NEPHROPATHY, WITHOUT LONG-TERM CURRENT USE OF INSULIN (HCC): ICD-10-CM

## 2020-12-18 DIAGNOSIS — N18.30 CKD STAGE 3 DUE TO TYPE 2 DIABETES MELLITUS (HCC): ICD-10-CM

## 2020-12-18 DIAGNOSIS — E11.22 CKD STAGE 3 DUE TO TYPE 2 DIABETES MELLITUS (HCC): ICD-10-CM

## 2020-12-18 DIAGNOSIS — E78.5 DYSLIPIDEMIA: ICD-10-CM

## 2020-12-18 NOTE — TELEPHONE ENCOUNTER
Patient is aware she needs labs done prior to 12/28 appt. Should she do labs in the system that were ordered by PCP or is there a different panel that Dr. Alicea would like to order?   Thank You!

## 2020-12-22 ENCOUNTER — APPOINTMENT (RX ONLY)
Dept: URBAN - METROPOLITAN AREA CLINIC 4 | Facility: CLINIC | Age: 65
Setting detail: DERMATOLOGY
End: 2020-12-22

## 2020-12-22 DIAGNOSIS — L81.4 OTHER MELANIN HYPERPIGMENTATION: ICD-10-CM

## 2020-12-22 DIAGNOSIS — L57.0 ACTINIC KERATOSIS: ICD-10-CM

## 2020-12-22 DIAGNOSIS — L82.1 OTHER SEBORRHEIC KERATOSIS: ICD-10-CM

## 2020-12-22 DIAGNOSIS — D18.0 HEMANGIOMA: ICD-10-CM

## 2020-12-22 DIAGNOSIS — D22 MELANOCYTIC NEVI: ICD-10-CM

## 2020-12-22 DIAGNOSIS — Z71.89 OTHER SPECIFIED COUNSELING: ICD-10-CM

## 2020-12-22 PROBLEM — D18.01 HEMANGIOMA OF SKIN AND SUBCUTANEOUS TISSUE: Status: ACTIVE | Noted: 2020-12-22

## 2020-12-22 PROBLEM — D22.5 MELANOCYTIC NEVI OF TRUNK: Status: ACTIVE | Noted: 2020-12-22

## 2020-12-22 PROBLEM — D22.39 MELANOCYTIC NEVI OF OTHER PARTS OF FACE: Status: ACTIVE | Noted: 2020-12-22

## 2020-12-22 PROCEDURE — ? LIQUID NITROGEN

## 2020-12-22 PROCEDURE — ? BENIGN DESTRUCTION COSMETIC

## 2020-12-22 PROCEDURE — 17000 DESTRUCT PREMALG LESION: CPT

## 2020-12-22 PROCEDURE — 99203 OFFICE O/P NEW LOW 30 MIN: CPT | Mod: 25

## 2020-12-22 PROCEDURE — ? COUNSELING

## 2020-12-22 PROCEDURE — 17003 DESTRUCT PREMALG LES 2-14: CPT

## 2020-12-22 ASSESSMENT — LOCATION DETAILED DESCRIPTION DERM
LOCATION DETAILED: LEFT CENTRAL MALAR CHEEK
LOCATION DETAILED: RIGHT INFERIOR FOREHEAD
LOCATION DETAILED: RIGHT CENTRAL TEMPLE
LOCATION DETAILED: RIGHT SUPERIOR MEDIAL MALAR CHEEK
LOCATION DETAILED: LEFT INFERIOR MEDIAL FOREHEAD
LOCATION DETAILED: RIGHT SUPERIOR NASAL CHEEK
LOCATION DETAILED: SUPERIOR MID FOREHEAD
LOCATION DETAILED: RIGHT CENTRAL MALAR CHEEK
LOCATION DETAILED: RIGHT CLAVICULAR NECK
LOCATION DETAILED: RIGHT CLAVICULAR SKIN
LOCATION DETAILED: LEFT LATERAL ZYGOMA
LOCATION DETAILED: RIGHT MEDIAL ZYGOMA
LOCATION DETAILED: LEFT INFERIOR ANTERIOR NECK
LOCATION DETAILED: RIGHT FOREHEAD
LOCATION DETAILED: LEFT CLAVICULAR NECK
LOCATION DETAILED: RIGHT MEDIAL MALAR CHEEK
LOCATION DETAILED: LEFT SUPERIOR NASAL CHEEK
LOCATION DETAILED: STERNAL NOTCH
LOCATION DETAILED: RIGHT UPPER CUTANEOUS LIP
LOCATION DETAILED: SUPERIOR THORACIC SPINE

## 2020-12-22 ASSESSMENT — LOCATION SIMPLE DESCRIPTION DERM
LOCATION SIMPLE: RIGHT ANTERIOR NECK
LOCATION SIMPLE: RIGHT ZYGOMA
LOCATION SIMPLE: LEFT CHEEK
LOCATION SIMPLE: SUPERIOR FOREHEAD
LOCATION SIMPLE: CHEST
LOCATION SIMPLE: LEFT ANTERIOR NECK
LOCATION SIMPLE: RIGHT LIP
LOCATION SIMPLE: LEFT FOREHEAD
LOCATION SIMPLE: RIGHT CHEEK
LOCATION SIMPLE: RIGHT FOREHEAD
LOCATION SIMPLE: LEFT ZYGOMA
LOCATION SIMPLE: RIGHT TEMPLE
LOCATION SIMPLE: UPPER BACK
LOCATION SIMPLE: RIGHT CLAVICULAR SKIN

## 2020-12-22 ASSESSMENT — LOCATION ZONE DERM
LOCATION ZONE: LIP
LOCATION ZONE: NECK
LOCATION ZONE: FACE
LOCATION ZONE: TRUNK

## 2020-12-22 NOTE — PROCEDURE: MIPS QUALITY
Quality 130: Documentation Of Current Medications In The Medical Record: Current Medications Documented
Quality 431: Preventive Care And Screening: Unhealthy Alcohol Use - Screening: Patient screened for unhealthy alcohol use using a single question and scores less than 2 times per year
Quality 226: Preventive Care And Screening: Tobacco Use: Screening And Cessation Intervention: Patient screened for tobacco use and is an ex/non-smoker
Detail Level: Generalized
Quality 111:Pneumonia Vaccination Status For Older Adults: Pneumococcal Vaccination Previously Received

## 2020-12-23 ENCOUNTER — HOSPITAL ENCOUNTER (OUTPATIENT)
Dept: RADIOLOGY | Facility: MEDICAL CENTER | Age: 65
End: 2020-12-23
Attending: PHYSICIAN ASSISTANT
Payer: MEDICARE

## 2020-12-23 DIAGNOSIS — Z12.31 VISIT FOR SCREENING MAMMOGRAM: ICD-10-CM

## 2020-12-23 DIAGNOSIS — M81.0 OSTEOPOROSIS, UNSPECIFIED OSTEOPOROSIS TYPE, UNSPECIFIED PATHOLOGICAL FRACTURE PRESENCE: ICD-10-CM

## 2020-12-23 PROCEDURE — 77067 SCR MAMMO BI INCL CAD: CPT

## 2020-12-23 PROCEDURE — 77080 DXA BONE DENSITY AXIAL: CPT

## 2020-12-28 ENCOUNTER — TELEMEDICINE (OUTPATIENT)
Dept: ENDOCRINOLOGY | Facility: MEDICAL CENTER | Age: 65
End: 2020-12-28
Attending: INTERNAL MEDICINE
Payer: MEDICARE

## 2020-12-28 ENCOUNTER — TELEPHONE (OUTPATIENT)
Dept: ENDOCRINOLOGY | Facility: MEDICAL CENTER | Age: 65
End: 2020-12-28

## 2020-12-28 DIAGNOSIS — E78.5 DYSLIPIDEMIA: ICD-10-CM

## 2020-12-28 DIAGNOSIS — E11.21 CONTROLLED TYPE 2 DIABETES MELLITUS WITH DIABETIC NEPHROPATHY, WITHOUT LONG-TERM CURRENT USE OF INSULIN (HCC): ICD-10-CM

## 2020-12-28 DIAGNOSIS — E11.22 CKD STAGE 3 DUE TO TYPE 2 DIABETES MELLITUS (HCC): ICD-10-CM

## 2020-12-28 DIAGNOSIS — N18.30 CKD STAGE 3 DUE TO TYPE 2 DIABETES MELLITUS (HCC): ICD-10-CM

## 2020-12-28 PROCEDURE — 99214 OFFICE O/P EST MOD 30 MIN: CPT | Mod: 95,CR | Performed by: INTERNAL MEDICINE

## 2020-12-28 RX ORDER — SEMAGLUTIDE 1.34 MG/ML
0.5 INJECTION, SOLUTION SUBCUTANEOUS
Qty: 1 EACH | Refills: 3 | Status: SHIPPED | OUTPATIENT
Start: 2020-12-28 | End: 2021-01-19 | Stop reason: SDUPTHER

## 2020-12-28 NOTE — PROGRESS NOTES
CHIEF COMPLAINT: Patient is here for follow up of Type 2 Diabetes Mellitus. Patient was presented for a telehealth consultation via secure and encrypted videoconferencing technology. This encounter was conducted via Zoom . Verbal consent was obtained. Patient's identity was verified.    HPI:     Garry Coronel is a 65 y.o. female with Type 2 Diabetes Mellitus here for follow up.     Patient has not been seen in the office for 2 years.  We dont have updated labs.  She forgot to complete her labs prior to this visit.  She was previously seen by the PA 2 years ago    She is on Ozempic 0.5mg weekly    She reports stable BGs    She has hyperlipidemia and is on atorvastatin.  We dont have any updated labs    She has a history of depression and anxiety and is on xanax and lexapro    She has chronic kidney disease stage III and we do not have any updated labs as well with respect to her GFR and electrolytes    She denies uremic symptoms such as nausea and vomiting         BG Diary:12/28/20  She stopped checking her BGs    Weight has been stable    Diabetes Complications   Retinopathy: No known retinopathy.  Last eye exam: Nov 27, 2020 at [facility]  Neuropathy: Denies paresthesias or numbness in hands or feet. Denies any foot wounds.  Exercise: Minimal.  Diet: Fair.  Patient's medications, allergies, and social histories were reviewed and updated as appropriate.    ROS:     CONS:     No fever, no chills   EYES:     No diplopia, no blurry vision   CV:           No chest pain, no palpitations   PULM:     No SOB, no cough, no hemoptysis.   GI:            No nausea, no vomiting, no diarrhea, no constipation   ENDO:     No polyuria, no polydipsia, no heat intolerance, no cold intolerance       Past Medical History:  Problem List:  2020-01: Depression  2020-01: Anxiety  2018-08: KRANTHI (acute kidney injury) (HCC)  2018-08: Subclavian artery stenosis, right ([facility])  2018-07: Dizziness  2018-07: Orthostatic syncope  2018-07:  Precordial pain  2018: Hypotension due to drugs  2017: Chest pain  2015: CKD stage 3 due to type 2 diabetes mellitus ([facility]ly [facility])  2015: DM (diabetes mellitus) type II controlled with renal   manifestation ([facility])  2014-10: Obesity (BMI 30-39.9)  2013: Vitamin D deficiency  2013: B12 deficiency  2013: Morbid obesity ([facility])  2013: DM II (diabetes mellitus, type II), controlled ([facility])  2013: Dyslipidemia  2013: HTN (hypertension)  2013: Obesity, morbid (more than 100 lbs over ideal weight or BMI   > 40) ([facility]ly [facility])  2013: Sleep apnea      Past Surgical History:  Past Surgical History:   Procedure Laterality Date   • GASTRIC BYPASS LAPAROSCOPIC  2006        Allergies:  Maxzide [hydrochlorothiazide w-triamterene]     Social History:  Social History     Tobacco Use   • Smoking status: Former Smoker     Types: Cigarettes     Quit date: 1978     Years since quittin.9   • Smokeless tobacco: Never Used   Substance Use Topics   • Alcohol use: Yes     Frequency: Monthly or less     Drinks per session: 1 or 2     Binge frequency: Never   • Drug use: No        Family History:   family history includes Cancer in her paternal grandfather; Diabetes in her brother, maternal aunt, and mother; Heart Disease in her maternal grandfather; Hyperlipidemia in her mother; Stroke in her maternal grandfather.      PHYSICAL EXAM:   OBJECTIVE:  Vital signs: LMP 2006   GENERAL: Well-developed, well-nourished in no apparent distress.   EYE:  No ocular asymmetry, PERRLA  HENT: Pink, moist mucous membranes.    NECK: No thyromegaly.   CARDIOVASCULAR:  No murmurs  LUNGS: Clear breath sounds  ABDOMEN: Soft, nontender   EXTREMITIES: No clubbing, cyanosis, or edema.   NEUROLOGICAL: No gross focal motor abnormalities   LYMPH: No cervical adenopathy seen  SKIN: No rashes, lesions.     Labs:  Lab Results   Component Value Date/Time    HBA1C 6.3 (A) 2019 01:08 PM        Lab Results   Component Value Date/Time     WBC 7.1 09/19/2019 08:36 AM    RBC 4.83 09/19/2019 08:36 AM    HEMOGLOBIN 14.1 09/19/2019 08:36 AM    MCV 92.5 09/19/2019 08:36 AM    MCH 29.2 09/19/2019 08:36 AM    MCHC 31.5 (L) 09/19/2019 08:36 AM    RDW 46.8 09/19/2019 08:36 AM    MPV 10.4 09/19/2019 08:36 AM       Lab Results   Component Value Date/Time    SODIUM 140 09/19/2019 08:36 AM    POTASSIUM 3.9 09/19/2019 08:36 AM    CHLORIDE 104 09/19/2019 08:36 AM    CO2 31 09/19/2019 08:36 AM    ANION 5.0 09/19/2019 08:36 AM    GLUCOSE 133 (H) 09/19/2019 08:36 AM    BUN 24 (H) 09/19/2019 08:36 AM    CREATININE 0.94 09/19/2019 08:36 AM    CALCIUM 9.5 09/19/2019 08:36 AM    ASTSGOT 17 07/21/2018 07:57 AM    ALTSGPT 13 07/21/2018 07:57 AM    TBILIRUBIN 0.5 07/21/2018 07:57 AM    ALBUMIN 4.2 07/21/2018 07:57 AM    TOTPROTEIN 7.4 07/21/2018 07:57 AM    GLOBULIN 3.2 07/21/2018 07:57 AM    AGRATIO 1.3 07/21/2018 07:57 AM       Lab Results   Component Value Date/Time    CHOLSTRLTOT 144 05/03/2018 0947    TRIGLYCERIDE 185 (H) 05/03/2018 0947    HDL 37 (A) 05/03/2018 0947    LDL 70 05/03/2018 0947       Lab Results   Component Value Date/Time    MALBCRT 6 09/19/2019 08:36 AM    MICROALBUR 0.9 09/19/2019 08:36 AM        Lab Results   Component Value Date/Time    TSHULTRASEN 2.030 07/21/2018 0757     No results found for: FREEDIR  No results found for: FREET3  No results found for: THYSTIMIG        ASSESSMENT/PLAN:     1. Controlled type 2 diabetes mellitus with diabetic nephropathy, without long-term current use of insulin (HCC)  Previously controlled   she is way overdue for labs her last A1c in 2019 was excellent but she needs to complete labs this week before the end of the year and I will update her after which I will refill her Ozempic.  Discussed with the patient importance of compliance with lab orders and with her medications I will see her again in 4 months with repeat of her A1c      2. Dyslipidemia  Uncontrolled we are getting a lipid panel this week and I will  update her     3. CKD stage 3 due to type 2 diabetes mellitus (HCC)  Unstable she is getting labs this week and I will update her      Return in about 3 months (around 3/28/2021).      This patient during there office visit today was started on a new medication.  Side effects of the new medication were discussed with the patient today in the office.     Thank you kindly for allowing me to participate in the diabetes care plan for this patient.    Javon Alicea MD, [facility], [facility]  12/28/20    CC:   Damaris Soares P.A.-C.

## 2020-12-29 ENCOUNTER — HOSPITAL ENCOUNTER (OUTPATIENT)
Dept: RADIOLOGY | Facility: MEDICAL CENTER | Age: 65
End: 2020-12-29
Attending: PHYSICIAN ASSISTANT
Payer: MEDICARE

## 2020-12-29 DIAGNOSIS — R92.8 ABNORMAL MAMMOGRAM: ICD-10-CM

## 2020-12-29 PROCEDURE — 77065 DX MAMMO INCL CAD UNI: CPT | Mod: RT

## 2021-01-07 DIAGNOSIS — R92.8 ABNORMAL FINDING ON BREAST IMAGING: ICD-10-CM

## 2021-01-07 DIAGNOSIS — U07.1 COVID-19: ICD-10-CM

## 2021-01-19 ENCOUNTER — PATIENT MESSAGE (OUTPATIENT)
Dept: MEDICAL GROUP | Facility: MEDICAL CENTER | Age: 66
End: 2021-01-19

## 2021-01-19 DIAGNOSIS — E11.21 CONTROLLED TYPE 2 DIABETES MELLITUS WITH DIABETIC NEPHROPATHY, WITHOUT LONG-TERM CURRENT USE OF INSULIN (HCC): ICD-10-CM

## 2021-01-19 RX ORDER — SEMAGLUTIDE 1.34 MG/ML
0.5 INJECTION, SOLUTION SUBCUTANEOUS
Qty: 1 EACH | Refills: 3 | Status: SHIPPED | OUTPATIENT
Start: 2021-01-19 | End: 2021-03-25 | Stop reason: SDUPTHER

## 2021-01-19 NOTE — TELEPHONE ENCOUNTER
From: Garry Coronel  To: Daamris Soares P.A.-C.  Sent: 1/19/2021 12:12 PM PST  Subject: Prescription Question    I just used the last ozempic injection today will you send me a refill to [facility] on [address] please

## 2021-03-03 DIAGNOSIS — Z23 NEED FOR VACCINATION: ICD-10-CM

## 2021-03-24 DIAGNOSIS — F33.0 MILD EPISODE OF RECURRENT MAJOR DEPRESSIVE DISORDER (HCC): ICD-10-CM

## 2021-03-24 RX ORDER — ALPRAZOLAM 0.25 MG/1
TABLET ORAL
Refills: 0 | OUTPATIENT
Start: 2021-03-24

## 2021-03-24 RX ORDER — ESCITALOPRAM OXALATE 10 MG/1
TABLET ORAL
Refills: 0 | OUTPATIENT
Start: 2021-03-24

## 2021-03-24 NOTE — TELEPHONE ENCOUNTER
Received request via: Pharmacy    Was the patient seen in the last year in this department? No     Does the patient have an active prescription (recently filled or refills available) for medication(s) requested? No     lvm for pt to schedule appt

## 2021-03-25 ENCOUNTER — TELEMEDICINE (OUTPATIENT)
Dept: MEDICAL GROUP | Facility: MEDICAL CENTER | Age: 66
End: 2021-03-25
Payer: MEDICARE

## 2021-03-25 VITALS
HEIGHT: 63 IN | WEIGHT: 176 LBS | BODY MASS INDEX: 31.18 KG/M2 | SYSTOLIC BLOOD PRESSURE: 124 MMHG | DIASTOLIC BLOOD PRESSURE: 72 MMHG

## 2021-03-25 DIAGNOSIS — F41.9 ANXIETY: ICD-10-CM

## 2021-03-25 DIAGNOSIS — E11.21 CONTROLLED TYPE 2 DIABETES MELLITUS WITH DIABETIC NEPHROPATHY, WITHOUT LONG-TERM CURRENT USE OF INSULIN (HCC): ICD-10-CM

## 2021-03-25 DIAGNOSIS — F33.0 MILD EPISODE OF RECURRENT MAJOR DEPRESSIVE DISORDER (HCC): ICD-10-CM

## 2021-03-25 DIAGNOSIS — I10 ESSENTIAL HYPERTENSION: ICD-10-CM

## 2021-03-25 PROCEDURE — 99213 OFFICE O/P EST LOW 20 MIN: CPT | Mod: 95,CR | Performed by: PHYSICIAN ASSISTANT

## 2021-03-25 RX ORDER — SEMAGLUTIDE 1.34 MG/ML
0.5 INJECTION, SOLUTION SUBCUTANEOUS
Qty: 1 EACH | Refills: 3 | Status: SHIPPED | OUTPATIENT
Start: 2021-03-25 | End: 2021-07-08 | Stop reason: SDUPTHER

## 2021-03-25 RX ORDER — AMLODIPINE BESYLATE 5 MG/1
5 TABLET ORAL DAILY
Qty: 90 TABLET | Refills: 1 | Status: SHIPPED | OUTPATIENT
Start: 2021-03-25 | End: 2021-07-08 | Stop reason: SDUPTHER

## 2021-03-25 RX ORDER — ALPRAZOLAM 0.25 MG/1
0.25 TABLET ORAL NIGHTLY PRN
Qty: 20 TABLET | Refills: 0 | Status: SHIPPED | OUTPATIENT
Start: 2021-03-25 | End: 2021-04-24

## 2021-03-25 RX ORDER — ESCITALOPRAM OXALATE 10 MG/1
TABLET ORAL
Qty: 90 TABLET | Refills: 0 | Status: SHIPPED | OUTPATIENT
Start: 2021-03-25 | End: 2021-07-08 | Stop reason: SDUPTHER

## 2021-03-25 RX ORDER — ALPRAZOLAM 0.25 MG/1
0.25 TABLET ORAL NIGHTLY PRN
COMMUNITY
End: 2021-03-25 | Stop reason: SDUPTHER

## 2021-03-25 NOTE — PROGRESS NOTES
"This visit was conducted utilizing secure and encrypted videoconferencing equipment, with the patient's consent.    Patient's identity was verified.          Chief Complaint   Patient presents with   • Anxiety     med refill xanan,lexapro    • Diabetes     med refill       HPI:     Garry Coronel is a 65 y.o. female. Patient is presenting to discuss:  Medication refill    Patient is moving to [place] to be close to her son and is very excited about it.  Continues on Ozempic for diabetes.    Patient continues on citalopram 10 mg for anxiety and depression.  States she takes it at night which actually helps her sleep as well.  Only takes Xanax as needed.      Past medical, surgical, family, and social history is reviewed in Epic chart by me today.   Medications and allergies reviewed and updated in Epic chart by me today.          Objective:     /72 (BP Location: Right arm, Patient Position: Sitting)   Ht 1.6 m (5' 3\")   Wt 79.8 kg (176 lb)  Body mass index is 31.18 kg/m².   Physical Exam:  Pain: sounds comfortable   Constitutional: Alert, no distress.  Eye: pupils Equal, conjunctiva clear,   Respiratory: Unlabored respiratory effort, speaking in full sentences, no audible wheezes.           Assessment and Plan:   The following treatment plan was discussed      1. Controlled type 2 diabetes mellitus with diabetic nephropathy, without long-term current use of insulin (HCC)    - Semaglutide,0.25 or 0.5MG/DOS, (OZEMPIC, 0.25 OR 0.5 MG/DOSE,) 2 MG/1.5ML Solution Pen-injector; Inject 0.5 mg under the skin every 7 days.  Dispense: 1 Each; Refill: 3    2. Mild episode of recurrent major depressive disorder (HCC)    - escitalopram (LEXAPRO) 10 MG Tab; Take 1 tablet by mouth once daily  Dispense: 90 tablet; Refill: 0    3. Essential hypertension    - amLODIPine (NORVASC) 5 MG Tab; Take 1 tablet by mouth every day.  Dispense: 90 tablet; Refill: 1    4. Anxiety     was verified   - ALPRAZolam (XANAX) 0.25 MG Tab; " Take 1 tablet by mouth at bedtime as needed for Sleep for up to 30 days.  Dispense: 20 tablet; Refill: 0              F/U prn pt is moving to [place]

## 2021-06-08 ENCOUNTER — TELEPHONE (OUTPATIENT)
Dept: MEDICAL GROUP | Facility: MEDICAL CENTER | Age: 66
End: 2021-06-08

## 2021-06-08 NOTE — TELEPHONE ENCOUNTER
Pt is way over due for labs and DM f/u. Have her come in ASAP and due labs before coming in.    Damaris Soares P.A.-C.

## 2021-06-08 NOTE — TELEPHONE ENCOUNTER
Phone Number Called: [phone] (home)     Call outcome: Did not leave a detailed message. Requested patient to call back.    Message: Lvm to cb regarding labs and appt.

## 2021-06-09 NOTE — TELEPHONE ENCOUNTER
Phone Number Called: [phone] (home)     Call outcome: Left detailed message for patient. Informed to call back with any additional questions.    Message: lvm to cb and schedule appt for dm.

## 2021-07-08 DIAGNOSIS — E11.21 CONTROLLED TYPE 2 DIABETES MELLITUS WITH DIABETIC NEPHROPATHY, WITHOUT LONG-TERM CURRENT USE OF INSULIN (HCC): ICD-10-CM

## 2021-07-08 DIAGNOSIS — I10 ESSENTIAL HYPERTENSION: ICD-10-CM

## 2021-07-08 DIAGNOSIS — E78.5 DYSLIPIDEMIA: ICD-10-CM

## 2021-07-08 DIAGNOSIS — F33.0 MILD EPISODE OF RECURRENT MAJOR DEPRESSIVE DISORDER (HCC): ICD-10-CM

## 2021-07-08 NOTE — TELEPHONE ENCOUNTER
Received request via: Patient    Was the patient seen in the last year in this department? Yes    Does the patient have an active prescription (recently filled or refills available) for medication(s) requested? No     Pt states she will be est care with another pcp within the next few weeks. I will send her lab slips so that she can get them done up there and send you the results. Are you ok with approving a 30 day supply only?

## 2021-07-13 RX ORDER — AMLODIPINE BESYLATE 5 MG/1
5 TABLET ORAL DAILY
Qty: 30 TABLET | Refills: 0 | Status: SHIPPED | OUTPATIENT
Start: 2021-07-13

## 2021-07-13 RX ORDER — SEMAGLUTIDE 1.34 MG/ML
0.5 INJECTION, SOLUTION SUBCUTANEOUS
Qty: 1 EACH | Refills: 0 | Status: SHIPPED | OUTPATIENT
Start: 2021-07-13

## 2021-07-13 RX ORDER — ATORVASTATIN CALCIUM 10 MG/1
TABLET, FILM COATED ORAL
Qty: 30 TABLET | Refills: 0 | Status: SHIPPED | OUTPATIENT
Start: 2021-07-13

## 2021-07-13 RX ORDER — ESCITALOPRAM OXALATE 10 MG/1
TABLET ORAL
Qty: 30 TABLET | Refills: 0 | Status: SHIPPED | OUTPATIENT
Start: 2021-07-13 | End: 2021-08-27

## 2021-08-17 LAB
25(OH)D3+25(OH)D2 SERPL-MCNC: 25.8 NG/ML (ref 30–100)
ALBUMIN SERPL-MCNC: 4.5 G/DL (ref 3.8–4.8)
ALBUMIN/CREAT UR: 8 MG/G CREAT (ref 0–29)
ALBUMIN/GLOB SERPL: 1.7 {RATIO} (ref 1.2–2.2)
ALP SERPL-CCNC: 139 IU/L (ref 48–121)
ALT SERPL-CCNC: 17 IU/L (ref 0–32)
AST SERPL-CCNC: 20 IU/L (ref 0–40)
BASOPHILS # BLD AUTO: 0.1 X10E3/UL (ref 0–0.2)
BASOPHILS NFR BLD AUTO: 1 %
BILIRUB SERPL-MCNC: 0.4 MG/DL (ref 0–1.2)
BUN SERPL-MCNC: 21 MG/DL (ref 8–27)
BUN/CREAT SERPL: 20 (ref 12–28)
CALCIUM SERPL-MCNC: 9.5 MG/DL (ref 8.7–10.3)
CHLORIDE SERPL-SCNC: 101 MMOL/L (ref 96–106)
CHOLEST SERPL-MCNC: 147 MG/DL (ref 100–199)
CO2 SERPL-SCNC: 26 MMOL/L (ref 20–29)
CREAT SERPL-MCNC: 1.05 MG/DL (ref 0.57–1)
CREAT UR-MCNC: 172.7 MG/DL
EOSINOPHIL # BLD AUTO: 0.3 X10E3/UL (ref 0–0.4)
EOSINOPHIL NFR BLD AUTO: 4 %
ERYTHROCYTE [DISTWIDTH] IN BLOOD BY AUTOMATED COUNT: 13.8 % (ref 11.7–15.4)
GLOBULIN SER CALC-MCNC: 2.7 G/DL (ref 1.5–4.5)
GLUCOSE SERPL-MCNC: 131 MG/DL (ref 65–99)
HBA1C MFR BLD: 8 % (ref 4.8–5.6)
HCT VFR BLD AUTO: 42.4 % (ref 34–46.6)
HDLC SERPL-MCNC: 47 MG/DL
HGB BLD-MCNC: 13.8 G/DL (ref 11.1–15.9)
IMM GRANULOCYTES # BLD AUTO: 0 X10E3/UL (ref 0–0.1)
IMM GRANULOCYTES NFR BLD AUTO: 0 %
IMMATURE CELLS  115398: NORMAL
LABORATORY COMMENT REPORT: NORMAL
LDLC SERPL CALC-MCNC: 81 MG/DL (ref 0–99)
LYMPHOCYTES # BLD AUTO: 1.9 X10E3/UL (ref 0.7–3.1)
LYMPHOCYTES NFR BLD AUTO: 21 %
MCH RBC QN AUTO: 28.5 PG (ref 26.6–33)
MCHC RBC AUTO-ENTMCNC: 32.5 G/DL (ref 31.5–35.7)
MCV RBC AUTO: 88 FL (ref 79–97)
MICROALBUMIN UR-MCNC: 13.7 UG/ML
MONOCYTES # BLD AUTO: 0.5 X10E3/UL (ref 0.1–0.9)
MONOCYTES NFR BLD AUTO: 6 %
MORPHOLOGY BLD-IMP: NORMAL
NEUTROPHILS # BLD AUTO: 6 X10E3/UL (ref 1.4–7)
NEUTROPHILS NFR BLD AUTO: 68 %
NRBC BLD AUTO-RTO: NORMAL %
PLATELET # BLD AUTO: 295 X10E3/UL (ref 150–450)
POTASSIUM SERPL-SCNC: 4.5 MMOL/L (ref 3.5–5.2)
PROT SERPL-MCNC: 7.2 G/DL (ref 6–8.5)
RBC # BLD AUTO: 4.84 X10E6/UL (ref 3.77–5.28)
SODIUM SERPL-SCNC: 142 MMOL/L (ref 134–144)
TRIGL SERPL-MCNC: 103 MG/DL (ref 0–149)
TSH SERPL DL<=0.005 MIU/L-ACNC: 2.07 UIU/ML (ref 0.45–4.5)
VLDLC SERPL CALC-MCNC: 19 MG/DL (ref 5–40)
WBC # BLD AUTO: 8.7 X10E3/UL (ref 3.4–10.8)

## 2021-08-27 DIAGNOSIS — F33.0 MILD EPISODE OF RECURRENT MAJOR DEPRESSIVE DISORDER (HCC): ICD-10-CM

## 2021-08-27 RX ORDER — ESCITALOPRAM OXALATE 10 MG/1
TABLET ORAL
Qty: 30 TABLET | Refills: 0 | Status: SHIPPED | OUTPATIENT
Start: 2021-08-27 | End: 2021-10-01

## 2021-10-01 DIAGNOSIS — F33.0 MILD EPISODE OF RECURRENT MAJOR DEPRESSIVE DISORDER (HCC): ICD-10-CM

## 2021-10-01 RX ORDER — ESCITALOPRAM OXALATE 10 MG/1
TABLET ORAL
Qty: 30 TABLET | Refills: 0 | Status: SHIPPED | OUTPATIENT
Start: 2021-10-01

## 2021-10-25 DIAGNOSIS — F33.0 MILD EPISODE OF RECURRENT MAJOR DEPRESSIVE DISORDER (HCC): ICD-10-CM

## 2021-10-27 RX ORDER — ESCITALOPRAM OXALATE 10 MG/1
TABLET ORAL
Qty: 30 TABLET | Refills: 0 | OUTPATIENT
Start: 2021-10-27

## 2021-10-30 DIAGNOSIS — I10 ESSENTIAL HYPERTENSION: ICD-10-CM

## 2021-11-02 RX ORDER — AMLODIPINE BESYLATE 5 MG/1
TABLET ORAL
Qty: 90 TABLET | Refills: 1 | OUTPATIENT
Start: 2021-11-02

## 2022-03-03 NOTE — TELEPHONE ENCOUNTER
Checked with [facility]: Labs not found, called lab [facility] and they did NOT show any lab results for the past 6 mo. Our records show last CMP 9/12/2016 with a Creatinine result of 1.22 Range is ( 0.50-1.40). Damaris, please note...   Azithromycin Counseling:  I discussed with the patient the risks of azithromycin including but not limited to GI upset, allergic reaction, drug rash, diarrhea, and yeast infections.